# Patient Record
Sex: FEMALE | Race: WHITE | Employment: OTHER | URBAN - METROPOLITAN AREA
[De-identification: names, ages, dates, MRNs, and addresses within clinical notes are randomized per-mention and may not be internally consistent; named-entity substitution may affect disease eponyms.]

---

## 2022-07-19 PROCEDURE — 88342 IMHCHEM/IMCYTCHM 1ST ANTB: CPT | Performed by: DERMATOLOGY

## 2022-07-19 PROCEDURE — 88341 IMHCHEM/IMCYTCHM EA ADD ANTB: CPT | Performed by: DERMATOLOGY

## 2022-07-29 ENCOUNTER — LAB REQUISITION (OUTPATIENT)
Dept: LAB | Facility: HOSPITAL | Age: 72
End: 2022-07-29
Payer: MEDICARE

## 2022-07-29 DIAGNOSIS — Z00.00 ENCOUNTER FOR GENERAL ADULT MEDICAL EXAMINATION WITHOUT ABNORMAL FINDINGS: ICD-10-CM

## 2022-08-05 ENCOUNTER — OFFICE VISIT (OUTPATIENT)
Dept: SURGERY | Facility: CLINIC | Age: 72
End: 2022-08-05
Payer: MEDICARE

## 2022-08-05 VITALS
BODY MASS INDEX: 25.14 KG/M2 | RESPIRATION RATE: 16 BRPM | WEIGHT: 136.63 LBS | DIASTOLIC BLOOD PRESSURE: 79 MMHG | HEART RATE: 81 BPM | HEIGHT: 62 IN | OXYGEN SATURATION: 96 % | SYSTOLIC BLOOD PRESSURE: 166 MMHG

## 2022-08-05 DIAGNOSIS — C43.9 MALIGNANT MELANOMA, METASTATIC (HCC): Primary | ICD-10-CM

## 2022-08-05 DIAGNOSIS — C44.599 OTHER SPECIFIED MALIGNANT NEOPLASM OF SKIN OF OTHER PART OF TRUNK: ICD-10-CM

## 2022-08-05 PROCEDURE — 99205 OFFICE O/P NEW HI 60 MIN: CPT | Performed by: SURGERY

## 2022-08-05 RX ORDER — ALENDRONATE SODIUM 70 MG/1
TABLET ORAL
COMMUNITY
Start: 2022-08-03

## 2022-08-05 RX ORDER — ASPIRIN 81 MG/1
TABLET ORAL
COMMUNITY
Start: 2021-12-17

## 2022-08-05 RX ORDER — FLUTICASONE PROPIONATE 50 MCG
SPRAY, SUSPENSION (ML) NASAL
COMMUNITY
Start: 2021-09-14

## 2022-08-05 RX ORDER — MELOXICAM 7.5 MG/1
TABLET ORAL
COMMUNITY
Start: 2021-06-15

## 2022-08-08 ENCOUNTER — TELEPHONE (OUTPATIENT)
Dept: SURGERY | Facility: CLINIC | Age: 72
End: 2022-08-08

## 2022-08-08 NOTE — TELEPHONE ENCOUNTER
Faxed signed release of records to [facility] requesting CT chest imaging surveillance of lung nodules. Assisted with setting up pt's PET scan on 8/11 at 1808 Darin Samuel

## 2022-08-11 ENCOUNTER — HOSPITAL ENCOUNTER (OUTPATIENT)
Dept: NUCLEAR MEDICINE | Facility: HOSPITAL | Age: 72
Discharge: HOME | End: 2022-08-11
Attending: PHYSICIAN ASSISTANT
Payer: MEDICARE

## 2022-08-11 DIAGNOSIS — C44.599 OTHER SPECIFIED MALIGNANT NEOPLASM OF SKIN OF OTHER PART OF TRUNK: ICD-10-CM

## 2022-08-11 DIAGNOSIS — C43.9 MALIGNANT MELANOMA, METASTATIC (HCC): ICD-10-CM

## 2022-08-11 LAB — GLUCOSE BLD-MCNC: 104 MG/DL (ref 70–99)

## 2022-08-11 PROCEDURE — 78816 PET IMAGE W/CT FULL BODY: CPT | Performed by: PHYSICIAN ASSISTANT

## 2022-08-11 PROCEDURE — 82962 GLUCOSE BLOOD TEST: CPT

## 2022-08-12 ENCOUNTER — TELEPHONE (OUTPATIENT)
Dept: SURGERY | Facility: CLINIC | Age: 72
End: 2022-08-12

## 2022-08-12 NOTE — TELEPHONE ENCOUNTER
Returned patient call to discuss PET results. Follow up appointment to discuss next steps scheduled for 8/19 at 12:30p    Jennifer Valdez PA-C  Department of Surgical Oncology  [address] [facility], [address] [place] [address]  [address] [place] [address]  T: [phone]  F: [phone]

## 2022-08-18 ENCOUNTER — OFFICE VISIT (OUTPATIENT)
Dept: SURGERY | Facility: CLINIC | Age: 72
End: 2022-08-18
Payer: MEDICARE

## 2022-08-18 ENCOUNTER — TELEPHONE (OUTPATIENT)
Dept: SURGERY | Facility: CLINIC | Age: 72
End: 2022-08-18

## 2022-08-18 VITALS
BODY MASS INDEX: 25 KG/M2 | WEIGHT: 134 LBS | HEART RATE: 80 BPM | DIASTOLIC BLOOD PRESSURE: 87 MMHG | RESPIRATION RATE: 16 BRPM | OXYGEN SATURATION: 97 % | TEMPERATURE: 98 F | SYSTOLIC BLOOD PRESSURE: 144 MMHG

## 2022-08-18 DIAGNOSIS — Z85.820 PERSONAL HISTORY OF MALIGNANT MELANOMA OF SKIN: Primary | ICD-10-CM

## 2022-08-18 DIAGNOSIS — C43.9 MALIGNANT MELANOMA, METASTATIC (HCC): Primary | ICD-10-CM

## 2022-08-18 PROCEDURE — 99213 OFFICE O/P EST LOW 20 MIN: CPT | Performed by: SURGERY

## 2022-08-18 NOTE — TELEPHONE ENCOUNTER
Contacted [facility] lab in [place] on [address] [place] [address], [facility] to f/u on slides requested on 8/15, Slides from 9/2014 and 10/2014. Also contacted Naa John in [place] to find alternate contact information for this lab.

## 2022-08-22 NOTE — PROGRESS NOTES
Patient is here to discuss PET scan results. Explained to her that there does not appear to be any evidence of distant metastasis. We had a chance to discuss her case in a multidisciplinary tumor board. Plan as follows:  1. Referral to medical oncology for consideration of immunotherapy as resected tumor is consistent with dermal metastasis and not a primary  2. Reexcision to negative margins of the index tumor. Patient was counseled about the risk of surgery including and not limited to bleeding infection seroma formation. She agrees and wishes to proceed. I will set up an appointment with Dr. Federica Walls. James Fletcher MD  [facility] General Surgical Oncology  [facility] 112  Pager [phone]  [email or web address]

## 2022-08-23 ENCOUNTER — LAB ENCOUNTER (OUTPATIENT)
Dept: LAB | Facility: HOSPITAL | Age: 72
End: 2022-08-23
Attending: SURGERY
Payer: MEDICARE

## 2022-08-23 DIAGNOSIS — D03.9: Primary | ICD-10-CM

## 2022-08-23 DIAGNOSIS — C43.9 MALIGNANT MELANOMA, METASTATIC (HCC): ICD-10-CM

## 2022-08-23 DIAGNOSIS — Z85.820 PERSONAL HISTORY OF MALIGNANT MELANOMA OF SKIN: Primary | ICD-10-CM

## 2022-08-23 PROCEDURE — 88321 CONSLTJ&REPRT SLD PREP ELSWR: CPT

## 2022-08-29 ENCOUNTER — HOSPITAL ENCOUNTER (OUTPATIENT)
Facility: HOSPITAL | Age: 72
Setting detail: HOSPITAL OUTPATIENT SURGERY
Discharge: HOME | End: 2022-08-29
Attending: SURGERY | Admitting: SURGERY
Payer: MEDICARE

## 2022-08-29 ENCOUNTER — ANESTHESIA (OUTPATIENT)
Dept: SURGERY | Facility: HOSPITAL | Age: 72
End: 2022-08-29
Payer: MEDICARE

## 2022-08-29 ENCOUNTER — ANESTHESIA EVENT (OUTPATIENT)
Dept: SURGERY | Facility: HOSPITAL | Age: 72
End: 2022-08-29
Payer: MEDICARE

## 2022-08-29 VITALS
TEMPERATURE: 98 F | OXYGEN SATURATION: 97 % | WEIGHT: 134.19 LBS | SYSTOLIC BLOOD PRESSURE: 109 MMHG | RESPIRATION RATE: 18 BRPM | HEART RATE: 78 BPM | BODY MASS INDEX: 24.69 KG/M2 | HEIGHT: 62 IN | DIASTOLIC BLOOD PRESSURE: 89 MMHG

## 2022-08-29 DIAGNOSIS — C43.9 MALIGNANT MELANOMA, METASTATIC (HCC): ICD-10-CM

## 2022-08-29 DIAGNOSIS — Z20.822 ENCOUNTER FOR PREOPERATIVE SCREENING LABORATORY TESTING FOR COVID-19 VIRUS: Primary | ICD-10-CM

## 2022-08-29 DIAGNOSIS — Z01.812 ENCOUNTER FOR PREOPERATIVE SCREENING LABORATORY TESTING FOR COVID-19 VIRUS: Primary | ICD-10-CM

## 2022-08-29 LAB — SARS-COV-2 RNA RESP QL NAA+PROBE: NOT DETECTED

## 2022-08-29 PROCEDURE — 88307 TISSUE EXAM BY PATHOLOGIST: CPT | Performed by: SURGERY

## 2022-08-29 PROCEDURE — 0JB70ZX EXCISION OF BACK SUBCUTANEOUS TISSUE AND FASCIA, OPEN APPROACH, DIAGNOSTIC: ICD-10-PCS | Performed by: SURGERY

## 2022-08-29 PROCEDURE — 81210 BRAF GENE: CPT | Performed by: INTERNAL MEDICINE

## 2022-08-29 PROCEDURE — 88381 MICRODISSECTION MANUAL: CPT | Performed by: INTERNAL MEDICINE

## 2022-08-29 RX ORDER — HEPARIN SODIUM 5000 [USP'U]/ML
5000 INJECTION, SOLUTION INTRAVENOUS; SUBCUTANEOUS ONCE
Status: COMPLETED | OUTPATIENT
Start: 2022-08-29 | End: 2022-08-29

## 2022-08-29 RX ORDER — METOCLOPRAMIDE HYDROCHLORIDE 5 MG/ML
INJECTION INTRAMUSCULAR; INTRAVENOUS AS NEEDED
Status: DISCONTINUED | OUTPATIENT
Start: 2022-08-29 | End: 2022-08-29 | Stop reason: SURG

## 2022-08-29 RX ORDER — TRAMADOL HYDROCHLORIDE 50 MG/1
TABLET ORAL EVERY 4 HOURS PRN
Qty: 20 TABLET | Refills: 0 | Status: SHIPPED | OUTPATIENT
Start: 2022-08-29

## 2022-08-29 RX ORDER — CEFAZOLIN SODIUM/WATER 2 G/20 ML
2 SYRINGE (ML) INTRAVENOUS ONCE
Status: COMPLETED | OUTPATIENT
Start: 2022-08-29 | End: 2022-08-29

## 2022-08-29 RX ORDER — HYDROMORPHONE HYDROCHLORIDE 1 MG/ML
0.2 INJECTION, SOLUTION INTRAMUSCULAR; INTRAVENOUS; SUBCUTANEOUS EVERY 5 MIN PRN
Status: DISCONTINUED | OUTPATIENT
Start: 2022-08-29 | End: 2022-08-29

## 2022-08-29 RX ORDER — NALOXONE HYDROCHLORIDE 0.4 MG/ML
80 INJECTION, SOLUTION INTRAMUSCULAR; INTRAVENOUS; SUBCUTANEOUS AS NEEDED
Status: DISCONTINUED | OUTPATIENT
Start: 2022-08-29 | End: 2022-08-29

## 2022-08-29 RX ORDER — HYDROMORPHONE HYDROCHLORIDE 1 MG/ML
0.4 INJECTION, SOLUTION INTRAMUSCULAR; INTRAVENOUS; SUBCUTANEOUS EVERY 5 MIN PRN
Status: DISCONTINUED | OUTPATIENT
Start: 2022-08-29 | End: 2022-08-29

## 2022-08-29 RX ORDER — SODIUM CHLORIDE, SODIUM LACTATE, POTASSIUM CHLORIDE, CALCIUM CHLORIDE 600; 310; 30; 20 MG/100ML; MG/100ML; MG/100ML; MG/100ML
INJECTION, SOLUTION INTRAVENOUS CONTINUOUS
Status: DISCONTINUED | OUTPATIENT
Start: 2022-08-29 | End: 2022-08-29

## 2022-08-29 RX ORDER — HYDROMORPHONE HYDROCHLORIDE 1 MG/ML
0.6 INJECTION, SOLUTION INTRAMUSCULAR; INTRAVENOUS; SUBCUTANEOUS EVERY 5 MIN PRN
Status: DISCONTINUED | OUTPATIENT
Start: 2022-08-29 | End: 2022-08-29

## 2022-08-29 RX ORDER — MIDAZOLAM HYDROCHLORIDE 1 MG/ML
INJECTION INTRAMUSCULAR; INTRAVENOUS AS NEEDED
Status: DISCONTINUED | OUTPATIENT
Start: 2022-08-29 | End: 2022-08-29 | Stop reason: SURG

## 2022-08-29 RX ORDER — ACETAMINOPHEN 500 MG
1000 TABLET ORAL ONCE
Status: DISCONTINUED | OUTPATIENT
Start: 2022-08-29 | End: 2022-08-29 | Stop reason: HOSPADM

## 2022-08-29 RX ORDER — BUPIVACAINE HYDROCHLORIDE 2.5 MG/ML
INJECTION, SOLUTION EPIDURAL; INFILTRATION; INTRACAUDAL AS NEEDED
Status: DISCONTINUED | OUTPATIENT
Start: 2022-08-29 | End: 2022-08-29 | Stop reason: HOSPADM

## 2022-08-29 RX ORDER — GLYCOPYRROLATE 0.2 MG/ML
INJECTION, SOLUTION INTRAMUSCULAR; INTRAVENOUS AS NEEDED
Status: DISCONTINUED | OUTPATIENT
Start: 2022-08-29 | End: 2022-08-29

## 2022-08-29 RX ORDER — ONDANSETRON 2 MG/ML
INJECTION INTRAMUSCULAR; INTRAVENOUS AS NEEDED
Status: DISCONTINUED | OUTPATIENT
Start: 2022-08-29 | End: 2022-08-29 | Stop reason: SURG

## 2022-08-29 RX ADMIN — ONDANSETRON 4 MG: 2 INJECTION INTRAMUSCULAR; INTRAVENOUS at 16:20:00

## 2022-08-29 RX ADMIN — GLYCOPYRROLATE 0.2 MG: 0.2 INJECTION, SOLUTION INTRAMUSCULAR; INTRAVENOUS at 16:20:00

## 2022-08-29 RX ADMIN — METOCLOPRAMIDE HYDROCHLORIDE 10 MG: 5 INJECTION INTRAMUSCULAR; INTRAVENOUS at 16:20:00

## 2022-08-29 RX ADMIN — CEFAZOLIN SODIUM/WATER 2 G: 2 G/20 ML SYRINGE (ML) INTRAVENOUS at 16:26:00

## 2022-08-29 RX ADMIN — MIDAZOLAM HYDROCHLORIDE 2 MG: 1 INJECTION INTRAMUSCULAR; INTRAVENOUS at 16:20:00

## 2022-08-29 RX ADMIN — SODIUM CHLORIDE, SODIUM LACTATE, POTASSIUM CHLORIDE, CALCIUM CHLORIDE: 600; 310; 30; 20 INJECTION, SOLUTION INTRAVENOUS at 16:16:00

## 2022-08-29 NOTE — INTERVAL H&P NOTE
Patient seen and examined. No changes to the attached history and physical are noted. 66year old female presenting today for planned wle. Talat Celis MD  Complex General Surgical Oncology  [facility]  Pager [phone]  [email or web address] ambulatory

## 2022-08-29 NOTE — ANESTHESIA POSTPROCEDURE EVALUATION
[id number] Patient Status:  Hospital Outpatient Surgery   Age/Gender 66year old female MRN [id number]   [facility] SURGERY Attending Kelle Saravia MD   [facility] Day # 0 PCP RASHAD DE SANTIAGO       Anesthesia Post-op Note    Wide local excision right upper back melanoma    Procedure Summary     Date: 08/29/22 Room / Location: [facility] MAIN OR 02 / [facility] MAIN OR    Anesthesia Start: 7369 Anesthesia Stop: 5066    Procedure: Wide local excision right upper back melanoma (Right ) Diagnosis:       Malignant melanoma, metastatic (HCC)      (Malignant melanoma, metastatic ([facility] [address] [C43.9])    Surgeons: Kelle Saravia MD Anesthesiologist: Celso Holbrook MD    Anesthesia Type: MAC ASA Status: 2          Anesthesia Type: No value filed.     Vitals Value Taken Time   /76 08/29/22 1702   Temp 97.5 08/29/22 1702   Pulse 99 08/29/22 1702   Resp 18 08/29/22 1702   SpO2 98 08/29/22 1702       Anesthesia Postop Note

## 2022-08-30 NOTE — OPERATIVE REPORT
Date of operation 8/29/2022    Preoperative diagnosis:  1. Malignant melanoma    Operations performed:  1. Reexcision of upper back metastatic malignant melanoma    Postoperative diagnosis:  1. Same    Operating surgeon:  1. Chandni Bazan MD    Assistant:  1. ROBIN Max    Indications: This is a 70-year-old female who was diagnosed several years ago with melanoma in situ. The patient presented with a subcutaneous soft mass which was excised. Pathology was consistent with a metastatic melanoma. Metastatic work-up was otherwise negative. The case was discussed in our multidisciplinary tumor board. Since the metastatic lesion excisional margins were positive, she presents for reexcision of the scar to obtain negative margins. Details of the operation:  The patient was brought to the operating room and laid supine on the operating table. She was then repositioned into prone position. Monitored anesthesia care was administered. The upper back was prepped and draped in standard sterile manner. Timeout was completed verifying the patient's name, procedure to be performed and administration of antibiotics. Everybody in the room was in agreement. A total of 20 cc of 1% lidocaine intermixed with 0.5% Marcaine with epinephrine were infiltrated. The the recent scar as well as the adjacent historic scar were marked out. A 15 blade was used to incise the skin. The incision was deepened through skin and subcutaneous tissue with electrocautery. Dissection was carried with electrocautery towards the muscular fascia which was included with the specimen. The specimen was then gradually lifted off of its underlying attachments and sent off for final pathologic analysis after orientation. Hemostasis was excellent. The wound was closed in 2 layers: 3-0 Vicryl interrupted subcutaneous and 4-0 Vicryl running subcuticular. Steri-Strips and sterile dressings were applied.   The patient tolerated the procedure well was taken to the postoperative anesthesia care and in a stable state. I was present for the entire case. Isabella Dominguez MD  Complex General Surgical Oncology  [facility] 112  Pager [phone]  [email or web address]

## 2022-08-31 ENCOUNTER — TELEPHONE (OUTPATIENT)
Dept: SURGERY | Facility: CLINIC | Age: 72
End: 2022-08-31

## 2022-08-31 NOTE — TELEPHONE ENCOUNTER
Called patient yesterday to see how she is feeling after her procedure with Dr. Delfina Sanchez. Pt doing well, no pain, no issues with surgical site, reviewed wound care instructions. Pt has a post op appt on 9/8 with Clementina at [facility]. Path pending. Pt knows to call with any questions or concerns.

## 2022-09-07 ENCOUNTER — TELEPHONE (OUTPATIENT)
Dept: SURGERY | Facility: CLINIC | Age: 72
End: 2022-09-07

## 2022-09-07 NOTE — TELEPHONE ENCOUNTER
Discussed path. Will need re-excision    Taryn Rodríguez MD  Complex General Surgical Oncology  [facility] 112  Pager [phone]  [email or web address]

## 2022-09-08 ENCOUNTER — OFFICE VISIT (OUTPATIENT)
Dept: SURGERY | Facility: CLINIC | Age: 72
End: 2022-09-08
Payer: MEDICARE

## 2022-09-08 ENCOUNTER — OFFICE VISIT (OUTPATIENT)
Dept: HEMATOLOGY/ONCOLOGY | Facility: HOSPITAL | Age: 72
End: 2022-09-08
Attending: INTERNAL MEDICINE
Payer: MEDICARE

## 2022-09-08 ENCOUNTER — TELEPHONE (OUTPATIENT)
Dept: HEMATOLOGY/ONCOLOGY | Facility: HOSPITAL | Age: 72
End: 2022-09-08

## 2022-09-08 VITALS
RESPIRATION RATE: 16 BRPM | HEART RATE: 88 BPM | WEIGHT: 134 LBS | DIASTOLIC BLOOD PRESSURE: 91 MMHG | OXYGEN SATURATION: 97 % | HEIGHT: 62.01 IN | SYSTOLIC BLOOD PRESSURE: 164 MMHG | TEMPERATURE: 98 F | BODY MASS INDEX: 24.35 KG/M2

## 2022-09-08 VITALS
OXYGEN SATURATION: 95 % | WEIGHT: 133 LBS | BODY MASS INDEX: 24.48 KG/M2 | HEIGHT: 62 IN | HEART RATE: 81 BPM | SYSTOLIC BLOOD PRESSURE: 157 MMHG | TEMPERATURE: 98 F | RESPIRATION RATE: 16 BRPM | DIASTOLIC BLOOD PRESSURE: 95 MMHG

## 2022-09-08 DIAGNOSIS — Z85.820 PERSONAL HISTORY OF MALIGNANT MELANOMA OF SKIN: Primary | ICD-10-CM

## 2022-09-08 DIAGNOSIS — C43.9 MALIGNANT MELANOMA, METASTATIC (HCC): Primary | ICD-10-CM

## 2022-09-08 PROCEDURE — 99205 OFFICE O/P NEW HI 60 MIN: CPT | Performed by: INTERNAL MEDICINE

## 2022-09-08 NOTE — PROGRESS NOTES
Outpatient Oncology Care Plan   Problem list:   fatigue   Problems related to:   disease/disease progression   Interventions:   provided general teaching   Expected outcomes:   understands plan of care   Progress towards outcome: making progress     Education Record   Learner: Patient   Barriers / Limitations: None   Method: Discussion   Outcome: Shows understanding   Comments:  Patient here as a new consult. Still experiencing pain to site. Will have repeat procedure next week. Denies any fevers, fatigue, or other symptoms at this time. Here to discuss next steps in plan of care.

## 2022-09-09 ENCOUNTER — TELEPHONE (OUTPATIENT)
Dept: HEMATOLOGY/ONCOLOGY | Facility: HOSPITAL | Age: 72
End: 2022-09-09

## 2022-09-09 NOTE — TELEPHONE ENCOUNTER
Returned call, pt had questions about when she should do the MRI of the brain. Pt having another surgery on her back next Friday. Was planning to schedule the MRI a week or 2 after the next surgery, it will be prior to starting therapy. In addition, she had questions about the stage of her cancer. Let her know we will call her back Monday after confirming with Dr Lucien Fitzgerald. Pt comfortable with the plan.

## 2022-09-09 NOTE — TELEPHONE ENCOUNTER
Patient calling and had consult with Dr Cherlynn Barthel yesterday. She has further questions.     Please call patient

## 2022-09-12 ENCOUNTER — TELEPHONE (OUTPATIENT)
Dept: SURGERY | Facility: CLINIC | Age: 72
End: 2022-09-12

## 2022-09-16 ENCOUNTER — OFFICE VISIT (OUTPATIENT)
Dept: SURGERY | Facility: CLINIC | Age: 72
End: 2022-09-16
Payer: MEDICARE

## 2022-09-16 ENCOUNTER — OFFICE VISIT (OUTPATIENT)
Dept: HEMATOLOGY/ONCOLOGY | Facility: HOSPITAL | Age: 72
End: 2022-09-16
Attending: INTERNAL MEDICINE
Payer: MEDICARE

## 2022-09-16 VITALS
HEIGHT: 62.01 IN | OXYGEN SATURATION: 96 % | TEMPERATURE: 98 F | WEIGHT: 134 LBS | HEART RATE: 113 BPM | RESPIRATION RATE: 16 BRPM | BODY MASS INDEX: 24.35 KG/M2 | DIASTOLIC BLOOD PRESSURE: 84 MMHG | SYSTOLIC BLOOD PRESSURE: 147 MMHG

## 2022-09-16 DIAGNOSIS — C43.9 MALIGNANT MELANOMA, METASTATIC (HCC): Primary | ICD-10-CM

## 2022-09-16 DIAGNOSIS — Z71.89 OTHER SPECIFIED COUNSELING: ICD-10-CM

## 2022-09-16 DIAGNOSIS — C43.59 MALIGNANT MELANOMA OF TORSO EXCLUDING BREAST (HCC): Primary | ICD-10-CM

## 2022-09-16 LAB — BRAF CODON 600 MUTATION DETECT: NOT DETECTED

## 2022-09-16 PROCEDURE — 88307 TISSUE EXAM BY PATHOLOGIST: CPT | Performed by: PHYSICIAN ASSISTANT

## 2022-09-16 PROCEDURE — 99024 POSTOP FOLLOW-UP VISIT: CPT | Performed by: SURGERY

## 2022-09-16 PROCEDURE — 11606 EXC TR-EXT MAL+MARG >4 CM: CPT | Performed by: SURGERY

## 2022-09-19 NOTE — PROCEDURES
Date of operation 9/16/2022    Preoperative diagnosis: #1 history of metastatic melanoma melanoma status post resection, upper back  2. Positive margin between 6 and 9 o'clock position/deep    Operations performed: #1 reexcision of involved margins, malignant melanoma upper back    Postoperative diagnosis:  1. Same    Operating Surgeon:  1. Martín Mejía MD    Assistant:  1. ROBIN Hamm    Indications: This is a very pleasant 13-year-old female who was diagnosed with metastatic melanoma involving the dermis and subcutis. She underwent wide local excision however margins were positive at the deep level and between 6 and 9:00. She presents for reexcision. Details of the operation:  The patient was brought to the procedure room and was laid in the prone position. The back was prepped and draped in the standard sterile manner. Timeout was completed verifying the patient's name, procedure to be performed and exact position/location. Everybody in the room was in agreement. A total of 25 cc of 1% lidocaine intermixed with 0.5 sent Marcaine with epinephrine were infiltrated. Using a 15 blade, an elliptical incision was made in line with the previous incision which was oriented at a 30 degree angle. This was deepened through skin and subcutaneous tissue. The incision was designed to include a centimeter of margin from the 6:00 to the 9 o'clock position of the incision [lower half.]  The previous incision was opened to maintain orientation. Electrocautery was used to incise the skin and subcutaneous tissue. Dissection was carried towards the muscle and the fascia was included alongside the specimen. The specimen was then lifted off of its underlying attachments with electrocautery. It was oriented and sent off for final pathologic analysis. The wound was irrigated thoroughly.   The wound was then closed in 2 layers: 3-0 Vicryl interrupted subcutaneous and 4-0 Vicryl running subcuticular. Steri-Strips and sterile dressings were applied. The patient tolerated the procedure well. I was present for the entire procedure. Nimisha Graf MD  [facility] General Surgical Oncology  [place] [address]  Pager [phone]  [email or web address]

## 2022-09-22 ENCOUNTER — TELEPHONE (OUTPATIENT)
Dept: SURGERY | Facility: CLINIC | Age: 72
End: 2022-09-22

## 2022-09-22 NOTE — TELEPHONE ENCOUNTER
Discussed pathology results in detail with patient. We will see her for previously scheduled post op visit Friday 9/30    Danna Matias PA-C  Department of [address] [place] [address]  [address] [facility] [place] [address] [place], [address]  T: [phone]  F: [phone]

## 2022-09-22 NOTE — TELEPHONE ENCOUNTER
Pt called with questions about pathology report, explained her case was discussed in melanoma TB. She is feeling well. Will have Dr. Bess Recio or Clementina KELLY call patient back to discuss pathology in more detail.

## 2022-09-22 NOTE — TELEPHONE ENCOUNTER
Feels ok  Discussed path    Cydney Carmona MD  Complex General Surgical Oncology  [facility] 112  Pager [phone]  [email or web address]

## 2022-09-30 ENCOUNTER — HOSPITAL ENCOUNTER (OUTPATIENT)
Dept: MRI IMAGING | Facility: HOSPITAL | Age: 72
Discharge: HOME | End: 2022-09-30
Attending: INTERNAL MEDICINE
Payer: MEDICARE

## 2022-09-30 ENCOUNTER — TELEPHONE (OUTPATIENT)
Dept: SURGERY | Facility: CLINIC | Age: 72
End: 2022-09-30

## 2022-09-30 ENCOUNTER — OFFICE VISIT (OUTPATIENT)
Dept: SURGERY | Facility: CLINIC | Age: 72
End: 2022-09-30
Payer: MEDICARE

## 2022-09-30 VITALS
OXYGEN SATURATION: 100 % | DIASTOLIC BLOOD PRESSURE: 94 MMHG | RESPIRATION RATE: 16 BRPM | HEART RATE: 84 BPM | SYSTOLIC BLOOD PRESSURE: 172 MMHG | TEMPERATURE: 98 F

## 2022-09-30 DIAGNOSIS — C43.9 MALIGNANT MELANOMA, METASTATIC (HCC): Primary | ICD-10-CM

## 2022-09-30 DIAGNOSIS — Z85.820 PERSONAL HISTORY OF MALIGNANT MELANOMA OF SKIN: ICD-10-CM

## 2022-09-30 PROCEDURE — A9575 INJ GADOTERATE MEGLUMI 0.1ML: HCPCS | Performed by: INTERNAL MEDICINE

## 2022-09-30 PROCEDURE — 99213 OFFICE O/P EST LOW 20 MIN: CPT | Performed by: SURGERY

## 2022-09-30 PROCEDURE — 70553 MRI BRAIN STEM W/O & W/DYE: CPT | Performed by: INTERNAL MEDICINE

## 2022-10-06 ENCOUNTER — SOCIAL WORK SERVICES (OUTPATIENT)
Dept: HEMATOLOGY/ONCOLOGY | Facility: HOSPITAL | Age: 72
End: 2022-10-06

## 2022-10-06 ENCOUNTER — OFFICE VISIT (OUTPATIENT)
Dept: HEMATOLOGY/ONCOLOGY | Facility: HOSPITAL | Age: 72
End: 2022-10-06
Attending: INTERNAL MEDICINE
Payer: MEDICARE

## 2022-10-06 ENCOUNTER — TELEPHONE (OUTPATIENT)
Dept: SURGERY | Facility: CLINIC | Age: 72
End: 2022-10-06

## 2022-10-06 VITALS
DIASTOLIC BLOOD PRESSURE: 93 MMHG | WEIGHT: 134 LBS | TEMPERATURE: 98 F | HEART RATE: 83 BPM | BODY MASS INDEX: 24.35 KG/M2 | SYSTOLIC BLOOD PRESSURE: 164 MMHG | HEIGHT: 62.01 IN | OXYGEN SATURATION: 96 % | RESPIRATION RATE: 16 BRPM

## 2022-10-06 DIAGNOSIS — Z85.820 PERSONAL HISTORY OF MALIGNANT MELANOMA OF SKIN: ICD-10-CM

## 2022-10-06 DIAGNOSIS — Z85.820 PERSONAL HISTORY OF MALIGNANT MELANOMA OF SKIN: Primary | ICD-10-CM

## 2022-10-06 DIAGNOSIS — Z86.006 HISTORY OF MELANOMA IN SITU: ICD-10-CM

## 2022-10-06 LAB
ALBUMIN SERPL-MCNC: 3.8 G/DL (ref 3.4–5)
ALBUMIN/GLOB SERPL: 1.1 {RATIO} (ref 1–2)
ALP LIVER SERPL-CCNC: 83 U/L
ALT SERPL-CCNC: 42 U/L
ANION GAP SERPL CALC-SCNC: 7 MMOL/L (ref 0–18)
AST SERPL-CCNC: 27 U/L (ref 15–37)
BASOPHILS # BLD AUTO: 0.03 X10(3) UL (ref 0–0.2)
BASOPHILS NFR BLD AUTO: 0.3 %
BILIRUB SERPL-MCNC: 0.4 MG/DL (ref 0.1–2)
BUN BLD-MCNC: 11 MG/DL (ref 7–18)
CALCIUM BLD-MCNC: 9 MG/DL (ref 8.5–10.1)
CHLORIDE SERPL-SCNC: 109 MMOL/L (ref 98–112)
CO2 SERPL-SCNC: 21 MMOL/L (ref 21–32)
CREAT BLD-MCNC: 0.81 MG/DL
EOSINOPHIL # BLD AUTO: 0.12 X10(3) UL (ref 0–0.7)
EOSINOPHIL NFR BLD AUTO: 1.4 %
ERYTHROCYTE [DISTWIDTH] IN BLOOD BY AUTOMATED COUNT: 12.7 %
GFR SERPLBLD BASED ON 1.73 SQ M-ARVRAT: 77 ML/MIN/1.73M2 (ref 60–?)
GLOBULIN PLAS-MCNC: 3.5 G/DL (ref 2.8–4.4)
GLUCOSE BLD-MCNC: 100 MG/DL (ref 70–99)
HCT VFR BLD AUTO: 43.5 %
HGB BLD-MCNC: 14.9 G/DL
IMM GRANULOCYTES # BLD AUTO: 0.02 X10(3) UL (ref 0–1)
IMM GRANULOCYTES NFR BLD: 0.2 %
LYMPHOCYTES # BLD AUTO: 2.7 X10(3) UL (ref 1–4)
LYMPHOCYTES NFR BLD AUTO: 31.2 %
MCH RBC QN AUTO: 34.3 PG (ref 26–34)
MCHC RBC AUTO-ENTMCNC: 34.3 G/DL (ref 31–37)
MCV RBC AUTO: 100.2 FL
MONOCYTES # BLD AUTO: 0.57 X10(3) UL (ref 0.1–1)
MONOCYTES NFR BLD AUTO: 6.6 %
NEUTROPHILS # BLD AUTO: 5.22 X10 (3) UL (ref 1.5–7.7)
NEUTROPHILS # BLD AUTO: 5.22 X10(3) UL (ref 1.5–7.7)
NEUTROPHILS NFR BLD AUTO: 60.3 %
OSMOLALITY SERPL CALC.SUM OF ELEC: 283 MOSM/KG (ref 275–295)
PLATELET # BLD AUTO: 405 10(3)UL (ref 150–450)
POTASSIUM SERPL-SCNC: 4 MMOL/L (ref 3.5–5.1)
PROT SERPL-MCNC: 7.3 G/DL (ref 6.4–8.2)
RBC # BLD AUTO: 4.34 X10(6)UL
SODIUM SERPL-SCNC: 137 MMOL/L (ref 136–145)
T4 FREE SERPL-MCNC: 0.8 NG/DL (ref 0.8–1.7)
TSI SER-ACNC: 1.71 MIU/ML (ref 0.36–3.74)
WBC # BLD AUTO: 8.7 X10(3) UL (ref 4–11)

## 2022-10-06 PROCEDURE — 96413 CHEMO IV INFUSION 1 HR: CPT

## 2022-10-06 PROCEDURE — 99214 OFFICE O/P EST MOD 30 MIN: CPT | Performed by: INTERNAL MEDICINE

## 2022-10-06 NOTE — PROGRESS NOTES
Outpatient Oncology Care Plan   Problem list:   fatigue   Problems related to:   side effect of treatment   Interventions:   provided general teaching   Expected outcomes:   understands plan of care   Progress towards outcome: making progress     Education Record   Learner: Patient   Barriers / Limitations: None   Method: Discussion   Outcome: Shows understanding   Comments:  Patient here for follow-up and planned C1D1 Keytruda. Denies any new or worsening symptoms since last visit.

## 2022-10-06 NOTE — PROGRESS NOTES
Pt here for C1D1. Arrives Ambulating independently, accompanied by Self           Pregnancy screening: Not applicable    Modifications in dose or schedule: No    Drugs/infusions dual verified for appearance and physical integrity. IV pump settings were dual verified: yes     Frequency of blood return and site check throughout administration: Prior to administration and At completion of therapy   Discharged to Home, Ambulating independently, accompanied by:Self    Outpatient Oncology Care Plan  Problem list:  knowledge deficit  Problems related to:  chemotherapy  Interventions:  provided general teaching  Expected outcomes:  understands plan of care  Progress towards outcome:  making progress    Education Record    Learner:  Patient  Barriers / Limitations:  None  Method:  Discussion and Reinforcement  Outcome:  Shows understanding  Comments: Pt tolerated infusion. Pt left in stable condition.

## 2022-10-06 NOTE — PROGRESS NOTES
SW completed an assessment with pt to provide support and encouragement due to treatment starting today. Chart reviewed and distress screening of 1 noted. Pt lives in [place], [place]. She is . Pt states that her adult daughter, Sheila Arenas is her support. Social determinants of health assessment completed with pt and no needs identified at this time. Pt presents with bright affect and mood. SW reviewed cancer support resources including [facility] and [employer]. Pt lives in the [address] [place] [address] and states that she may pursue [employer] which is close to her daughter. SW provided a packet of resources including information on transportation resources, homecare/home health agencies, Facebook support group page and counseling resources. SW reviewed Advance Directives and importance of completion. SW explained role of SW in [facility] and provided support as pt shared feelings and thoughts on cancer diagnosis. SW contact information given. SW provided a [employer] bag which pt was very grateful for. Pt requested a blank copy of the [facility] that she will review and complete with her daughter. She plans to bring back for her next appt with Dr. Federica Walls or treatment. Coping skills reviewed and support services encouraged due to cancer dx. Leta Ramirez LCSW   at [facility]    [address], [address] [place] [address], [place], [address] [place] [address] [place] [address] Yasir Carter  Ph: [phone]. [email or web address]  Fax: [phone]

## 2022-10-07 ENCOUNTER — TELEPHONE (OUTPATIENT)
Dept: HEMATOLOGY/ONCOLOGY | Facility: HOSPITAL | Age: 72
End: 2022-10-07

## 2022-10-07 NOTE — TELEPHONE ENCOUNTER
Toxicities: C1 D1 Pembrolizumab on 10/6/2022    I attempted to reach Yennifer Barakat to see how she is feeling after her first day of treatment. I left a voice mail message asking her to please return my call at her earliest convenience.

## 2022-10-11 ENCOUNTER — TELEPHONE (OUTPATIENT)
Dept: HEMATOLOGY/ONCOLOGY | Facility: HOSPITAL | Age: 72
End: 2022-10-11

## 2022-10-11 NOTE — TELEPHONE ENCOUNTER
Patient had immunotherapy last thursday. She has hives since 10/7/22. She did take benadryl. It helped a little. Please call. Thank you. None

## 2022-10-11 NOTE — TELEPHONE ENCOUNTER
Toxicities: C1 D1 Pembrolizumab on 10/6/2022    Hives/Itching: (Patient reports Friday evening she noticed her arms felt itchy. No redness or rash. She also saw some hives on her neck. She took 1 benadryl and the itching and hives resolved. The same thing happened on Saturday evening. Again she took 1 benadryl and it resolved. No problems on Sunday. On Monday evening the hives on her neck and itching on her arms returned. She took 1 benadryl and it resolved. Today the hives are back on her neck and she has itching on her arms. She denies redness or rash. No dyspnea, swollen lips or tongue. No difficulty swallowing food or fluids.)    I asked Radha Luna to please hold. I updated Kiarra Esteves. She recommended Radha Luna take 1-2 tabs of benadryl every 4-6 hours as needed. I updated Radha Luna. She agreed with the plan. She will call the office if her symptoms get worse.

## 2022-10-13 ENCOUNTER — VIRTUAL PHONE E/M (OUTPATIENT)
Dept: HEMATOLOGY/ONCOLOGY | Facility: HOSPITAL | Age: 72
End: 2022-10-13
Attending: INTERNAL MEDICINE
Payer: MEDICARE

## 2022-10-13 DIAGNOSIS — L50.9 HIVES: ICD-10-CM

## 2022-10-13 DIAGNOSIS — C43.9 METASTATIC MELANOMA (HCC): ICD-10-CM

## 2022-10-13 PROCEDURE — G2252 BRIEF CHKIN BY MD/QHP, 11-20: HCPCS | Performed by: CLINICAL NURSE SPECIALIST

## 2022-10-13 RX ORDER — PREDNISONE 10 MG/1
10 TABLET ORAL DAILY
Qty: 30 TABLET | Refills: 0 | Status: SHIPPED | OUTPATIENT
Start: 2022-10-13

## 2022-10-25 ENCOUNTER — TELEPHONE (OUTPATIENT)
Dept: SURGERY | Facility: CLINIC | Age: 72
End: 2022-10-25

## 2022-10-25 ENCOUNTER — TELEPHONE (OUTPATIENT)
Dept: HEMATOLOGY/ONCOLOGY | Facility: HOSPITAL | Age: 72
End: 2022-10-25

## 2022-10-25 PROBLEM — M79.606 PAIN IN LOWER LIMB: Status: ACTIVE | Noted: 2022-10-25

## 2022-10-25 PROBLEM — I77.811 ABDOMINAL AORTIC ECTASIA: Status: ACTIVE | Noted: 2022-10-25

## 2022-10-25 PROBLEM — H81.10 BENIGN PAROXYSMAL POSITIONAL VERTIGO: Status: ACTIVE | Noted: 2022-10-25

## 2022-10-25 PROBLEM — I77.811 ABDOMINAL AORTIC ECTASIA (HCC): Status: ACTIVE | Noted: 2022-10-25

## 2022-10-25 PROBLEM — R06.09 DYSPNEA ON EXERTION: Status: ACTIVE | Noted: 2022-10-25

## 2022-10-25 RX ORDER — CETIRIZINE HYDROCHLORIDE 10 MG/1
10 TABLET ORAL EVERY 12 HOURS PRN
Qty: 60 TABLET | Refills: 0 | Status: SHIPPED | OUTPATIENT
Start: 2022-10-25

## 2022-10-25 NOTE — TELEPHONE ENCOUNTER
Spoke with patient. She started prednisone 10 mg and Zyrtec on Friday 10/21/22 due to worsening hives. She wanted to confirm she is still able to receive immunotherapy on Thursday 10/27/22. Per MD, safe to proceed. Patient verbalized understanding.

## 2022-10-25 NOTE — TELEPHONE ENCOUNTER
Renetta He calling asking if she take her prescribed medication with her immuno therapy.  Thank you nora

## 2022-10-25 NOTE — TELEPHONE ENCOUNTER
Patient requested a copy of Dr Marcy Cruz progress notes, OR report, path report and consult post op visit note.  PT states she will pick it up at [facility].   I'll give it to Linnea Gonzalez tomorrow to take to [facility].

## 2022-10-27 ENCOUNTER — OFFICE VISIT (OUTPATIENT)
Dept: HEMATOLOGY/ONCOLOGY | Facility: HOSPITAL | Age: 72
End: 2022-10-27
Attending: INTERNAL MEDICINE
Payer: MEDICARE

## 2022-10-27 VITALS
HEART RATE: 85 BPM | OXYGEN SATURATION: 98 % | TEMPERATURE: 97 F | SYSTOLIC BLOOD PRESSURE: 148 MMHG | WEIGHT: 137.5 LBS | RESPIRATION RATE: 16 BRPM | BODY MASS INDEX: 24.98 KG/M2 | DIASTOLIC BLOOD PRESSURE: 89 MMHG | HEIGHT: 62.01 IN

## 2022-10-27 DIAGNOSIS — Z85.820 PERSONAL HISTORY OF MALIGNANT MELANOMA OF SKIN: Primary | ICD-10-CM

## 2022-10-27 DIAGNOSIS — Z85.820 PERSONAL HISTORY OF MALIGNANT MELANOMA OF SKIN: ICD-10-CM

## 2022-10-27 LAB
ALBUMIN SERPL-MCNC: 3.9 G/DL (ref 3.4–5)
ALBUMIN/GLOB SERPL: 1.1 {RATIO} (ref 1–2)
ALP LIVER SERPL-CCNC: 78 U/L
ALT SERPL-CCNC: 66 U/L
ANION GAP SERPL CALC-SCNC: 4 MMOL/L (ref 0–18)
AST SERPL-CCNC: 44 U/L (ref 15–37)
BASOPHILS # BLD AUTO: 0.07 X10(3) UL (ref 0–0.2)
BASOPHILS NFR BLD AUTO: 0.5 %
BILIRUB SERPL-MCNC: 0.3 MG/DL (ref 0.1–2)
BUN BLD-MCNC: 13 MG/DL (ref 7–18)
CALCIUM BLD-MCNC: 9.1 MG/DL (ref 8.5–10.1)
CHLORIDE SERPL-SCNC: 105 MMOL/L (ref 98–112)
CO2 SERPL-SCNC: 27 MMOL/L (ref 21–32)
CREAT BLD-MCNC: 0.87 MG/DL
EOSINOPHIL # BLD AUTO: 0.09 X10(3) UL (ref 0–0.7)
EOSINOPHIL NFR BLD AUTO: 0.7 %
ERYTHROCYTE [DISTWIDTH] IN BLOOD BY AUTOMATED COUNT: 13 %
GFR SERPLBLD BASED ON 1.73 SQ M-ARVRAT: 70 ML/MIN/1.73M2 (ref 60–?)
GLOBULIN PLAS-MCNC: 3.7 G/DL (ref 2.8–4.4)
GLUCOSE BLD-MCNC: 96 MG/DL (ref 70–99)
HCT VFR BLD AUTO: 43 %
HGB BLD-MCNC: 14.8 G/DL
IMM GRANULOCYTES # BLD AUTO: 0.05 X10(3) UL (ref 0–1)
IMM GRANULOCYTES NFR BLD: 0.4 %
LYMPHOCYTES # BLD AUTO: 3.01 X10(3) UL (ref 1–4)
LYMPHOCYTES NFR BLD AUTO: 23 %
MCH RBC QN AUTO: 34.5 PG (ref 26–34)
MCHC RBC AUTO-ENTMCNC: 34.4 G/DL (ref 31–37)
MCV RBC AUTO: 100.2 FL
MONOCYTES # BLD AUTO: 0.63 X10(3) UL (ref 0.1–1)
MONOCYTES NFR BLD AUTO: 4.8 %
NEUTROPHILS # BLD AUTO: 9.25 X10 (3) UL (ref 1.5–7.7)
NEUTROPHILS # BLD AUTO: 9.25 X10(3) UL (ref 1.5–7.7)
NEUTROPHILS NFR BLD AUTO: 70.6 %
OSMOLALITY SERPL CALC.SUM OF ELEC: 282 MOSM/KG (ref 275–295)
PLATELET # BLD AUTO: 391 10(3)UL (ref 150–450)
POTASSIUM SERPL-SCNC: 3.5 MMOL/L (ref 3.5–5.1)
PROT SERPL-MCNC: 7.6 G/DL (ref 6.4–8.2)
RBC # BLD AUTO: 4.29 X10(6)UL
SODIUM SERPL-SCNC: 136 MMOL/L (ref 136–145)
WBC # BLD AUTO: 13.1 X10(3) UL (ref 4–11)

## 2022-10-27 PROCEDURE — 96413 CHEMO IV INFUSION 1 HR: CPT

## 2022-10-27 PROCEDURE — 99215 OFFICE O/P EST HI 40 MIN: CPT | Performed by: INTERNAL MEDICINE

## 2022-10-27 PROCEDURE — S0028 INJECTION, FAMOTIDINE, 20 MG: HCPCS | Performed by: INTERNAL MEDICINE

## 2022-10-27 PROCEDURE — 96375 TX/PRO/DX INJ NEW DRUG ADDON: CPT

## 2022-10-27 RX ORDER — FAMOTIDINE 10 MG/ML
20 INJECTION, SOLUTION INTRAVENOUS ONCE
Status: COMPLETED | OUTPATIENT
Start: 2022-10-27 | End: 2022-10-27

## 2022-10-27 RX ADMIN — FAMOTIDINE 20 MG: 10 INJECTION, SOLUTION INTRAVENOUS at 13:22:00

## 2022-10-27 NOTE — PROGRESS NOTES
Outpatient Oncology Care Plan   Problem list:   fatigue   Problems related to:   side effect of treatment   Interventions:   provided general teaching   Expected outcomes:   understands plan of care   Progress towards outcome: making progress     Education Record   Learner: Patient   Barriers / Limitations: None   Method: Discussion   Outcome: Shows understanding   Comments:  Patient here for follow-up and planned C2D1 treatment. Still remains on Prednisone and Zyrtec for her rash. She states the rash has completely resolved as of today. Energy levels are somewhat lower.

## 2022-10-27 NOTE — PROGRESS NOTES
Pt here for C2D1 of Keytruda  Arrives Ambulating independently, accompanied by Self           Pregnancy screening: Not applicable    Modifications in dose or schedule: Yes    Drugs/infusions dual verified for appearance and physical integrity. IV pump settings were dual verified: yes     Frequency of blood return and site check throughout administration: Prior to administration   Discharged to Home, Ambulating independently, accompanied by:Self    Outpatient Oncology Care Plan  Problem list:  knowledge deficit  Problems related to:  chemotherapy  Interventions:  chemotherapy teaching  Expected outcomes:  optimal lab values  Progress towards outcome:  making progress    Education Record    Learner:  Patient  Barriers / Limitations:  None  Method:  Brief focused  Outcome:  Shows understanding  Comments:    Patient was given Pepcid IV prior to her Anne-Marie Bee this time .  Apparently she had developed a rash that lasted few days after first Keytruda was infused

## 2022-10-31 ENCOUNTER — SOCIAL WORK SERVICES (OUTPATIENT)
Dept: HEMATOLOGY/ONCOLOGY | Facility: HOSPITAL | Age: 72
End: 2022-10-31

## 2022-10-31 NOTE — PROGRESS NOTES
spoke to patient and completed [employer] Form, mailing to patient along with required documentation/pathology reports.

## 2022-11-10 ENCOUNTER — TELEMEDICINE (OUTPATIENT)
Dept: HEMATOLOGY/ONCOLOGY | Facility: HOSPITAL | Age: 72
End: 2022-11-10
Attending: INTERNAL MEDICINE
Payer: MEDICARE

## 2022-11-10 ENCOUNTER — TELEPHONE (OUTPATIENT)
Dept: HEMATOLOGY/ONCOLOGY | Facility: HOSPITAL | Age: 72
End: 2022-11-10

## 2022-11-10 DIAGNOSIS — R21 RASH: ICD-10-CM

## 2022-11-10 DIAGNOSIS — R22.1 NECK SWELLING: ICD-10-CM

## 2022-11-10 DIAGNOSIS — C43.9 METASTATIC MELANOMA (HCC): Primary | ICD-10-CM

## 2022-11-10 PROCEDURE — 99213 OFFICE O/P EST LOW 20 MIN: CPT | Performed by: CLINICAL NURSE SPECIALIST

## 2022-11-10 NOTE — TELEPHONE ENCOUNTER
Patient is having a new reaction to her Javad Fran and it is coming and going and everyday, She has a rash is all over her entire body, swollen lymph nodes, Dr. Stephanie Oquendo pt.

## 2022-11-10 NOTE — TELEPHONE ENCOUNTER
Toxicities: C2 D1 Pembrolizumab on 10/27/2022    Rash/Swollen Lymph Node: (Patient reports that on 10/29/2022, two days after her second treatment she woke up with a red, raised, itchy rash all over her body with the exception of the face. She states she had a similar rash after her first treatment. She has been taking one tab of zyrtec twice a day and 10mg of prednisone daily. The rash seemed to get better. It was no longer raised and it was less itchy. Two days ago the rash flared again. It is red, not raised, itchy. No fluid filled vesicles. She also reports last Thursday she found a swollen lymph node in the center of the front of her neck between her two collar bones. It was hard to touch. The skin felt hot. This symptoms lasted 2 days and then resolved. Yesterday the swollen lymph node returned.)    Remington Sullivan said she lives two hours away. She won't come for an acute care visit today. She would like a call back with what she should do? Should she increase her dose of prednisone?

## 2022-11-10 NOTE — TELEPHONE ENCOUNTER
I called Vanda Cervantes and updated her that Dr Kimberlee Giles wants her to have an ACV video visit today. She agreed. I [facility] [address] [place] [phone] to book the appointment and explain how the video visit works.

## 2022-11-17 ENCOUNTER — OFFICE VISIT (OUTPATIENT)
Dept: HEMATOLOGY/ONCOLOGY | Facility: HOSPITAL | Age: 72
End: 2022-11-17
Attending: INTERNAL MEDICINE
Payer: MEDICARE

## 2022-11-17 VITALS
BODY MASS INDEX: 26 KG/M2 | TEMPERATURE: 98 F | OXYGEN SATURATION: 96 % | RESPIRATION RATE: 18 BRPM | DIASTOLIC BLOOD PRESSURE: 89 MMHG | HEART RATE: 89 BPM | WEIGHT: 140.38 LBS | SYSTOLIC BLOOD PRESSURE: 150 MMHG

## 2022-11-17 DIAGNOSIS — Z85.820 PERSONAL HISTORY OF MALIGNANT MELANOMA OF SKIN: ICD-10-CM

## 2022-11-17 DIAGNOSIS — Z85.820 PERSONAL HISTORY OF MALIGNANT MELANOMA OF SKIN: Primary | ICD-10-CM

## 2022-11-17 LAB
ALBUMIN SERPL-MCNC: 3.9 G/DL (ref 3.4–5)
ALBUMIN/GLOB SERPL: 1.2 {RATIO} (ref 1–2)
ALP LIVER SERPL-CCNC: 90 U/L
ALT SERPL-CCNC: 59 U/L
ANION GAP SERPL CALC-SCNC: 4 MMOL/L (ref 0–18)
AST SERPL-CCNC: 50 U/L (ref 15–37)
BASOPHILS # BLD AUTO: 0.08 X10(3) UL (ref 0–0.2)
BASOPHILS NFR BLD AUTO: 0.7 %
BILIRUB SERPL-MCNC: 0.3 MG/DL (ref 0.1–2)
BUN BLD-MCNC: 11 MG/DL (ref 7–18)
CALCIUM BLD-MCNC: 9.2 MG/DL (ref 8.5–10.1)
CHLORIDE SERPL-SCNC: 107 MMOL/L (ref 98–112)
CO2 SERPL-SCNC: 26 MMOL/L (ref 21–32)
CREAT BLD-MCNC: 0.8 MG/DL
EOSINOPHIL # BLD AUTO: 0.16 X10(3) UL (ref 0–0.7)
EOSINOPHIL NFR BLD AUTO: 1.4 %
ERYTHROCYTE [DISTWIDTH] IN BLOOD BY AUTOMATED COUNT: 13.3 %
GFR SERPLBLD BASED ON 1.73 SQ M-ARVRAT: 77 ML/MIN/1.73M2 (ref 60–?)
GLOBULIN PLAS-MCNC: 3.3 G/DL (ref 2.8–4.4)
GLUCOSE BLD-MCNC: 96 MG/DL (ref 70–99)
HCT VFR BLD AUTO: 43.3 %
HGB BLD-MCNC: 14.8 G/DL
IMM GRANULOCYTES # BLD AUTO: 0.05 X10(3) UL (ref 0–1)
IMM GRANULOCYTES NFR BLD: 0.4 %
LYMPHOCYTES # BLD AUTO: 4.57 X10(3) UL (ref 1–4)
LYMPHOCYTES NFR BLD AUTO: 39.2 %
MCH RBC QN AUTO: 34.6 PG (ref 26–34)
MCHC RBC AUTO-ENTMCNC: 34.2 G/DL (ref 31–37)
MCV RBC AUTO: 101.2 FL
MONOCYTES # BLD AUTO: 0.83 X10(3) UL (ref 0.1–1)
MONOCYTES NFR BLD AUTO: 7.1 %
NEUTROPHILS # BLD AUTO: 5.97 X10 (3) UL (ref 1.5–7.7)
NEUTROPHILS # BLD AUTO: 5.97 X10(3) UL (ref 1.5–7.7)
NEUTROPHILS NFR BLD AUTO: 51.2 %
OSMOLALITY SERPL CALC.SUM OF ELEC: 283 MOSM/KG (ref 275–295)
PLATELET # BLD AUTO: 406 10(3)UL (ref 150–450)
POTASSIUM SERPL-SCNC: 3.8 MMOL/L (ref 3.5–5.1)
PROT SERPL-MCNC: 7.2 G/DL (ref 6.4–8.2)
RBC # BLD AUTO: 4.28 X10(6)UL
SODIUM SERPL-SCNC: 137 MMOL/L (ref 136–145)
T4 FREE SERPL-MCNC: 0.9 NG/DL (ref 0.8–1.7)
TSI SER-ACNC: 1.97 MIU/ML (ref 0.36–3.74)
WBC # BLD AUTO: 11.7 X10(3) UL (ref 4–11)

## 2022-11-17 PROCEDURE — 99215 OFFICE O/P EST HI 40 MIN: CPT | Performed by: INTERNAL MEDICINE

## 2022-11-17 PROCEDURE — 96413 CHEMO IV INFUSION 1 HR: CPT

## 2022-11-17 PROCEDURE — S0028 INJECTION, FAMOTIDINE, 20 MG: HCPCS | Performed by: INTERNAL MEDICINE

## 2022-11-17 PROCEDURE — 96375 TX/PRO/DX INJ NEW DRUG ADDON: CPT

## 2022-11-17 RX ORDER — FAMOTIDINE 10 MG/ML
20 INJECTION, SOLUTION INTRAVENOUS ONCE
Status: COMPLETED | OUTPATIENT
Start: 2022-11-17 | End: 2022-11-17

## 2022-11-17 RX ORDER — PREDNISONE 10 MG/1
10 TABLET ORAL DAILY
Qty: 30 TABLET | Refills: 0 | Status: CANCELLED | OUTPATIENT
Start: 2022-11-17

## 2022-11-17 RX ORDER — FAMOTIDINE 10 MG/ML
20 INJECTION, SOLUTION INTRAVENOUS ONCE
Status: CANCELLED
Start: 2022-11-17 | End: 2022-11-17

## 2022-11-17 RX ORDER — PREDNISONE 1 MG/1
10 TABLET ORAL DAILY
Qty: 180 TABLET | Refills: 3 | Status: SHIPPED | OUTPATIENT
Start: 2022-11-17 | End: 2023-11-12

## 2022-11-17 RX ADMIN — FAMOTIDINE 20 MG: 10 INJECTION, SOLUTION INTRAVENOUS at 12:29:00

## 2022-11-17 NOTE — PROGRESS NOTES
Outpatient Oncology Care Plan   Problem list:   fatigue   Problems related to:   side effect of treatment   Interventions:   provided general teaching   Expected outcomes:   understands plan of care   Progress towards outcome: making progress     Education Record   Learner: Patient   Barriers / Limitations: None   Method: Discussion   Outcome: Shows understanding   Comments:  Patient here for follow-up and planned C3D1 Keytruda. Had a recent rash with previous cycle. Remains on prednisone and Zyrtec as of now. States she noticed some swelling to the left side of her neck that responds to the use of prednisone.

## 2022-11-17 NOTE — PROGRESS NOTES
Pt here for C3D1. Arrives Ambulating independently, accompanied by Self           Pregnancy screening: Not applicable    Modifications in dose or schedule: No    Drugs/infusions dual verified for appearance and physical integrity. IV pump settings were dual verified: yes     Frequency of blood return and site check throughout administration: Prior to administration   Discharged to Home, Ambulating independently, accompanied by:Self    Outpatient Oncology Care Plan  Problem list:  knowledge deficit  Problems related to:  side effect of treatment  Interventions:  monitor effect of therapy  Expected outcomes:  understands plan of care  Progress towards outcome:  making progress    Education Record    Learner:  Patient  Barriers / Limitations:  None  Method:  Discussion and Printed material  Outcome:  Shows understanding  Comments:    Here for treatment. Feeling well. Still has rash in days after infusion. On zyrtec and low dose steroid at home from MD and given IV pepcid prior to infusion. Tolerated infusion well, no complaints during. Left in stable condition. Next appt made.

## 2022-12-08 ENCOUNTER — SOCIAL WORK SERVICES (OUTPATIENT)
Dept: HEMATOLOGY/ONCOLOGY | Facility: HOSPITAL | Age: 72
End: 2022-12-08

## 2022-12-08 ENCOUNTER — OFFICE VISIT (OUTPATIENT)
Dept: HEMATOLOGY/ONCOLOGY | Facility: HOSPITAL | Age: 72
End: 2022-12-08
Attending: INTERNAL MEDICINE
Payer: MEDICARE

## 2022-12-08 VITALS
RESPIRATION RATE: 18 BRPM | TEMPERATURE: 97 F | DIASTOLIC BLOOD PRESSURE: 98 MMHG | HEIGHT: 62.01 IN | WEIGHT: 139 LBS | SYSTOLIC BLOOD PRESSURE: 143 MMHG | OXYGEN SATURATION: 99 % | BODY MASS INDEX: 25.26 KG/M2 | HEART RATE: 95 BPM

## 2022-12-08 DIAGNOSIS — Z85.820 PERSONAL HISTORY OF MALIGNANT MELANOMA OF SKIN: Primary | ICD-10-CM

## 2022-12-08 DIAGNOSIS — C43.9 METASTATIC MELANOMA (HCC): Primary | ICD-10-CM

## 2022-12-08 LAB
ALBUMIN SERPL-MCNC: 3.8 G/DL (ref 3.4–5)
ALBUMIN/GLOB SERPL: 1 {RATIO} (ref 1–2)
ALP LIVER SERPL-CCNC: 75 U/L
ALT SERPL-CCNC: 44 U/L
ANION GAP SERPL CALC-SCNC: 6 MMOL/L (ref 0–18)
AST SERPL-CCNC: 32 U/L (ref 15–37)
BASOPHILS # BLD AUTO: 0.07 X10(3) UL (ref 0–0.2)
BASOPHILS NFR BLD AUTO: 0.7 %
BILIRUB SERPL-MCNC: 0.3 MG/DL (ref 0.1–2)
BUN BLD-MCNC: 10 MG/DL (ref 7–18)
CALCIUM BLD-MCNC: 9.1 MG/DL (ref 8.5–10.1)
CHLORIDE SERPL-SCNC: 107 MMOL/L (ref 98–112)
CO2 SERPL-SCNC: 25 MMOL/L (ref 21–32)
CREAT BLD-MCNC: 0.89 MG/DL
EOSINOPHIL # BLD AUTO: 0.14 X10(3) UL (ref 0–0.7)
EOSINOPHIL NFR BLD AUTO: 1.4 %
ERYTHROCYTE [DISTWIDTH] IN BLOOD BY AUTOMATED COUNT: 13 %
GFR SERPLBLD BASED ON 1.73 SQ M-ARVRAT: 68 ML/MIN/1.73M2 (ref 60–?)
GLOBULIN PLAS-MCNC: 3.7 G/DL (ref 2.8–4.4)
GLUCOSE BLD-MCNC: 90 MG/DL (ref 70–99)
HCT VFR BLD AUTO: 43.2 %
HGB BLD-MCNC: 14.8 G/DL
IMM GRANULOCYTES # BLD AUTO: 0.05 X10(3) UL (ref 0–1)
IMM GRANULOCYTES NFR BLD: 0.5 %
LYMPHOCYTES # BLD AUTO: 3.47 X10(3) UL (ref 1–4)
LYMPHOCYTES NFR BLD AUTO: 34.1 %
MCH RBC QN AUTO: 34.8 PG (ref 26–34)
MCHC RBC AUTO-ENTMCNC: 34.3 G/DL (ref 31–37)
MCV RBC AUTO: 101.6 FL
MONOCYTES # BLD AUTO: 0.73 X10(3) UL (ref 0.1–1)
MONOCYTES NFR BLD AUTO: 7.2 %
NEUTROPHILS # BLD AUTO: 5.73 X10 (3) UL (ref 1.5–7.7)
NEUTROPHILS # BLD AUTO: 5.73 X10(3) UL (ref 1.5–7.7)
NEUTROPHILS NFR BLD AUTO: 56.1 %
OSMOLALITY SERPL CALC.SUM OF ELEC: 285 MOSM/KG (ref 275–295)
PLATELET # BLD AUTO: 424 10(3)UL (ref 150–450)
POTASSIUM SERPL-SCNC: 3.7 MMOL/L (ref 3.5–5.1)
PROT SERPL-MCNC: 7.5 G/DL (ref 6.4–8.2)
RBC # BLD AUTO: 4.25 X10(6)UL
SODIUM SERPL-SCNC: 138 MMOL/L (ref 136–145)
WBC # BLD AUTO: 10.2 X10(3) UL (ref 4–11)

## 2022-12-08 PROCEDURE — 99215 OFFICE O/P EST HI 40 MIN: CPT | Performed by: INTERNAL MEDICINE

## 2022-12-08 PROCEDURE — 96375 TX/PRO/DX INJ NEW DRUG ADDON: CPT

## 2022-12-08 PROCEDURE — S0028 INJECTION, FAMOTIDINE, 20 MG: HCPCS | Performed by: INTERNAL MEDICINE

## 2022-12-08 PROCEDURE — 96413 CHEMO IV INFUSION 1 HR: CPT

## 2022-12-08 RX ORDER — PREDNISONE 20 MG/1
10 TABLET ORAL ONCE
Status: COMPLETED | OUTPATIENT
Start: 2022-12-08 | End: 2022-12-08

## 2022-12-08 RX ORDER — FAMOTIDINE 10 MG/ML
20 INJECTION, SOLUTION INTRAVENOUS ONCE
Status: COMPLETED | OUTPATIENT
Start: 2022-12-08 | End: 2022-12-08

## 2022-12-08 RX ORDER — PREDNISONE 20 MG/1
10 TABLET ORAL ONCE
Status: CANCELLED
Start: 2022-12-08 | End: 2022-12-08

## 2022-12-08 RX ORDER — FAMOTIDINE 10 MG/ML
20 INJECTION, SOLUTION INTRAVENOUS ONCE
Status: CANCELLED
Start: 2022-12-08 | End: 2022-12-08

## 2022-12-08 RX ADMIN — FAMOTIDINE 20 MG: 10 INJECTION, SOLUTION INTRAVENOUS at 11:50:00

## 2022-12-08 RX ADMIN — PREDNISONE 10 MG: 20 TABLET ORAL at 11:39:00

## 2022-12-08 NOTE — PROGRESS NOTES
Pt here for C4D1 Keytruda. Arrives Ambulating independently, accompanied by Self           Pregnancy screening: Not applicable    Modifications in dose or schedule: no    Drugs/infusions dual verified for appearance and physical integrity. IV pump settings were dual verified: yes     Frequency of blood return and site check throughout administration: Prior to administration and At completion of therapy   Discharged to Home, Ambulating independently, accompanied by:Self    Outpatient Oncology Care Plan  Problem list:  rash  Problems related to:  side effect of treatment  Interventions:  encourage activity as tolerated  maintain skin integrity  Expected outcomes:  adequate sleep/rest  optimal lab values  symptoms relieved/minimized  Progress towards outcome:  making progress    Education Record    Learner:  Patient  Barriers / Limitations:  None  Method:  Brief focused, Discussion and Reinforcement  Outcome:  Shows understanding  Comments: Patient has a rash still present from previous [place] Prednisone and pepcid given as ordered. Patient tolerated infusion w/o complaint. Understands to call with any questions or concerns.

## 2022-12-08 NOTE — PROGRESS NOTES
met with patient and completed Letter, sending to [address] K#[phone], to patient directly in person, and via [address] Madison Metz is a patient under my care at [facility] for her diagnosis of Metastatic Melanoma. She is currently undergoing Keytruda treatment in order to activate cells in the body to kill the cancerous cells.

## 2022-12-08 NOTE — PROGRESS NOTES
Education Record    Learner:  Patient    Disease / Diagnosis: melanoma  OTV Keytruda    Barriers / Limitations:  None   Comments:    Method:  Discussion   Comments:    General Topics:  Plan of care reviewed   Comments:    Outcome:  Shows understanding   Comments:for rash  Taking prednisone 5mg, last 2 days, held for 3-4 days to assess rash, it got worse when she was off, so she resumed. Takes zyrtec daily   Energy level is stable, denies, cough/ SOB, abdominal pain, diarrhea.

## 2022-12-15 ENCOUNTER — HOSPITAL ENCOUNTER (OUTPATIENT)
Dept: NUCLEAR MEDICINE | Facility: HOSPITAL | Age: 72
Discharge: HOME | End: 2022-12-15
Attending: INTERNAL MEDICINE
Payer: MEDICARE

## 2022-12-15 DIAGNOSIS — Z85.820 PERSONAL HISTORY OF MALIGNANT MELANOMA OF SKIN: ICD-10-CM

## 2022-12-15 LAB — GLUCOSE BLD-MCNC: 107 MG/DL (ref 70–99)

## 2022-12-15 PROCEDURE — 82962 GLUCOSE BLOOD TEST: CPT

## 2022-12-15 PROCEDURE — 78816 PET IMAGE W/CT FULL BODY: CPT | Performed by: INTERNAL MEDICINE

## 2022-12-29 ENCOUNTER — OFFICE VISIT (OUTPATIENT)
Dept: HEMATOLOGY/ONCOLOGY | Facility: HOSPITAL | Age: 72
End: 2022-12-29
Attending: INTERNAL MEDICINE
Payer: MEDICARE

## 2022-12-29 ENCOUNTER — SOCIAL WORK SERVICES (OUTPATIENT)
Dept: HEMATOLOGY/ONCOLOGY | Facility: HOSPITAL | Age: 72
End: 2022-12-29

## 2022-12-29 VITALS
BODY MASS INDEX: 25.71 KG/M2 | TEMPERATURE: 98 F | SYSTOLIC BLOOD PRESSURE: 147 MMHG | DIASTOLIC BLOOD PRESSURE: 91 MMHG | HEIGHT: 62.01 IN | RESPIRATION RATE: 18 BRPM | OXYGEN SATURATION: 97 % | HEART RATE: 88 BPM | WEIGHT: 141.5 LBS

## 2022-12-29 DIAGNOSIS — Z85.820 PERSONAL HISTORY OF MALIGNANT MELANOMA OF SKIN: Primary | ICD-10-CM

## 2022-12-29 LAB
ALBUMIN SERPL-MCNC: 3.8 G/DL (ref 3.4–5)
ALBUMIN/GLOB SERPL: 1 {RATIO} (ref 1–2)
ALP LIVER SERPL-CCNC: 78 U/L
ALT SERPL-CCNC: 59 U/L
ANION GAP SERPL CALC-SCNC: 7 MMOL/L (ref 0–18)
AST SERPL-CCNC: 43 U/L (ref 15–37)
BASOPHILS # BLD AUTO: 0.05 X10(3) UL (ref 0–0.2)
BASOPHILS NFR BLD AUTO: 0.4 %
BILIRUB SERPL-MCNC: 0.4 MG/DL (ref 0.1–2)
BUN BLD-MCNC: 13 MG/DL (ref 7–18)
CALCIUM BLD-MCNC: 9.2 MG/DL (ref 8.5–10.1)
CHLORIDE SERPL-SCNC: 108 MMOL/L (ref 98–112)
CO2 SERPL-SCNC: 23 MMOL/L (ref 21–32)
CREAT BLD-MCNC: 0.82 MG/DL
EOSINOPHIL # BLD AUTO: 0.03 X10(3) UL (ref 0–0.7)
EOSINOPHIL NFR BLD AUTO: 0.3 %
ERYTHROCYTE [DISTWIDTH] IN BLOOD BY AUTOMATED COUNT: 13.2 %
FASTING STATUS PATIENT QL REPORTED: NO
GFR SERPLBLD BASED ON 1.73 SQ M-ARVRAT: 75 ML/MIN/1.73M2 (ref 60–?)
GLOBULIN PLAS-MCNC: 3.8 G/DL (ref 2.8–4.4)
GLUCOSE BLD-MCNC: 101 MG/DL (ref 70–99)
HCT VFR BLD AUTO: 45.3 %
HGB BLD-MCNC: 15.6 G/DL
IMM GRANULOCYTES # BLD AUTO: 0.06 X10(3) UL (ref 0–1)
IMM GRANULOCYTES NFR BLD: 0.5 %
LYMPHOCYTES # BLD AUTO: 2.06 X10(3) UL (ref 1–4)
LYMPHOCYTES NFR BLD AUTO: 17.2 %
MCH RBC QN AUTO: 34.9 PG (ref 26–34)
MCHC RBC AUTO-ENTMCNC: 34.4 G/DL (ref 31–37)
MCV RBC AUTO: 101.3 FL
MONOCYTES # BLD AUTO: 0.47 X10(3) UL (ref 0.1–1)
MONOCYTES NFR BLD AUTO: 3.9 %
NEUTROPHILS # BLD AUTO: 9.29 X10 (3) UL (ref 1.5–7.7)
NEUTROPHILS # BLD AUTO: 9.29 X10(3) UL (ref 1.5–7.7)
NEUTROPHILS NFR BLD AUTO: 77.7 %
OSMOLALITY SERPL CALC.SUM OF ELEC: 286 MOSM/KG (ref 275–295)
PLATELET # BLD AUTO: 387 10(3)UL (ref 150–450)
POTASSIUM SERPL-SCNC: 3.9 MMOL/L (ref 3.5–5.1)
PROT SERPL-MCNC: 7.6 G/DL (ref 6.4–8.2)
RBC # BLD AUTO: 4.47 X10(6)UL
SODIUM SERPL-SCNC: 138 MMOL/L (ref 136–145)
T4 FREE SERPL-MCNC: 0.8 NG/DL (ref 0.8–1.7)
TSI SER-ACNC: 1.63 MIU/ML (ref 0.36–3.74)
WBC # BLD AUTO: 12 X10(3) UL (ref 4–11)

## 2022-12-29 PROCEDURE — 99215 OFFICE O/P EST HI 40 MIN: CPT | Performed by: INTERNAL MEDICINE

## 2022-12-29 PROCEDURE — 96375 TX/PRO/DX INJ NEW DRUG ADDON: CPT

## 2022-12-29 PROCEDURE — S0028 INJECTION, FAMOTIDINE, 20 MG: HCPCS | Performed by: INTERNAL MEDICINE

## 2022-12-29 PROCEDURE — 96413 CHEMO IV INFUSION 1 HR: CPT

## 2022-12-29 RX ORDER — FAMOTIDINE 10 MG/ML
20 INJECTION, SOLUTION INTRAVENOUS ONCE
Status: CANCELLED
Start: 2022-12-29 | End: 2022-12-29

## 2022-12-29 RX ORDER — PREDNISONE 20 MG/1
20 TABLET ORAL DAILY
Status: DISCONTINUED | OUTPATIENT
Start: 2022-12-29 | End: 2022-12-29

## 2022-12-29 RX ORDER — PREDNISONE 20 MG/1
20 TABLET ORAL DAILY
Status: CANCELLED
Start: 2022-12-29

## 2022-12-29 RX ORDER — FAMOTIDINE 10 MG/ML
20 INJECTION, SOLUTION INTRAVENOUS ONCE
Status: COMPLETED | OUTPATIENT
Start: 2022-12-29 | End: 2022-12-29

## 2022-12-29 RX ADMIN — FAMOTIDINE 20 MG: 10 INJECTION, SOLUTION INTRAVENOUS at 12:37:00

## 2022-12-29 NOTE — PROGRESS NOTES
Outpatient Oncology Care Plan   Problem list:   fatigue   Problems related to:   side effect of treatment   Interventions:   provided general teaching   Expected outcomes:   understands plan of care   Progress towards outcome: making progress     Education Record   Learner: Patient   Barriers / Limitations: None   Method: Discussion   Outcome: Shows understanding   Comments:  Patient here for follow-up and possible C5D1 Keytruda. Still has a rash that comes and goes despite taking 10 mg of prednisone and Zyrtec daily. Recently had PET scan performed.

## 2022-12-29 NOTE — PROGRESS NOTES
Pt here for C 5 D 1 Keytruda. Arrives Ambulating independently, accompanied by Self           Pregnancy screening: Not applicable    Modifications in dose or schedule: No    Drugs/infusions dual verified for appearance and physical integrity.  IV pump settings were dual verified: yes     Frequency of blood return and site check throughout administration: Prior to administration and At completion of therapy   Discharged to Home, Ambulating independently, accompanied by:Self    Outpatient Oncology Care Plan  Problem list:  fatigue  knowledge deficit  Problems related to:  chemotherapy  Interventions:  monitor effect of therapy  monitor lab values  promoted rest  Expected outcomes:  safe in environment  symptoms relieved/minimized  understands plan of care  verbalize how to care for self  Progress towards outcome:  unchanged    Education Record    Learner:  Patient  Barriers / Limitations:  None  Method:  Discussion  Outcome:  Shows understanding  Comments:

## 2022-12-29 NOTE — PROGRESS NOTES
faxed notes from 22 and 22 to [employer] X#[phone] and provided copies to patient in person.    ( 10/20/1942, [facility] [address] [address], Claim # [id number], Policy No. [id number])

## 2023-01-19 ENCOUNTER — OFFICE VISIT (OUTPATIENT)
Dept: HEMATOLOGY/ONCOLOGY | Facility: HOSPITAL | Age: 73
End: 2023-01-19
Attending: INTERNAL MEDICINE
Payer: MEDICARE

## 2023-01-19 ENCOUNTER — SOCIAL WORK SERVICES (OUTPATIENT)
Dept: HEMATOLOGY/ONCOLOGY | Facility: HOSPITAL | Age: 73
End: 2023-01-19

## 2023-01-19 ENCOUNTER — TELEPHONE (OUTPATIENT)
Dept: SURGERY | Facility: CLINIC | Age: 73
End: 2023-01-19

## 2023-01-19 VITALS — SYSTOLIC BLOOD PRESSURE: 153 MMHG | HEART RATE: 53 BPM | DIASTOLIC BLOOD PRESSURE: 84 MMHG

## 2023-01-19 VITALS
OXYGEN SATURATION: 98 % | SYSTOLIC BLOOD PRESSURE: 163 MMHG | TEMPERATURE: 97 F | RESPIRATION RATE: 18 BRPM | HEART RATE: 102 BPM | HEIGHT: 62.01 IN | WEIGHT: 143 LBS | BODY MASS INDEX: 25.98 KG/M2 | DIASTOLIC BLOOD PRESSURE: 94 MMHG

## 2023-01-19 DIAGNOSIS — Z85.820 PERSONAL HISTORY OF MALIGNANT MELANOMA OF SKIN: Primary | ICD-10-CM

## 2023-01-19 LAB
ALBUMIN SERPL-MCNC: 4 G/DL (ref 3.4–5)
ALBUMIN/GLOB SERPL: 1.1 {RATIO} (ref 1–2)
ALP LIVER SERPL-CCNC: 75 U/L
ALT SERPL-CCNC: 63 U/L
ANION GAP SERPL CALC-SCNC: 6 MMOL/L (ref 0–18)
AST SERPL-CCNC: 43 U/L (ref 15–37)
BASOPHILS # BLD AUTO: 0.07 X10(3) UL (ref 0–0.2)
BASOPHILS NFR BLD AUTO: 0.5 %
BILIRUB SERPL-MCNC: 0.3 MG/DL (ref 0.1–2)
BUN BLD-MCNC: 15 MG/DL (ref 7–18)
CALCIUM BLD-MCNC: 9.3 MG/DL (ref 8.5–10.1)
CHLORIDE SERPL-SCNC: 106 MMOL/L (ref 98–112)
CO2 SERPL-SCNC: 25 MMOL/L (ref 21–32)
CREAT BLD-MCNC: 0.87 MG/DL
EOSINOPHIL # BLD AUTO: 0.07 X10(3) UL (ref 0–0.7)
EOSINOPHIL NFR BLD AUTO: 0.5 %
ERYTHROCYTE [DISTWIDTH] IN BLOOD BY AUTOMATED COUNT: 13 %
GFR SERPLBLD BASED ON 1.73 SQ M-ARVRAT: 70 ML/MIN/1.73M2 (ref 60–?)
GLOBULIN PLAS-MCNC: 3.5 G/DL (ref 2.8–4.4)
GLUCOSE BLD-MCNC: 100 MG/DL (ref 70–99)
HCT VFR BLD AUTO: 44.1 %
HGB BLD-MCNC: 15.2 G/DL
IMM GRANULOCYTES # BLD AUTO: 0.05 X10(3) UL (ref 0–1)
IMM GRANULOCYTES NFR BLD: 0.4 %
LYMPHOCYTES # BLD AUTO: 2.91 X10(3) UL (ref 1–4)
LYMPHOCYTES NFR BLD AUTO: 22.4 %
MCH RBC QN AUTO: 35.1 PG (ref 26–34)
MCHC RBC AUTO-ENTMCNC: 34.5 G/DL (ref 31–37)
MCV RBC AUTO: 101.8 FL
MONOCYTES # BLD AUTO: 0.67 X10(3) UL (ref 0.1–1)
MONOCYTES NFR BLD AUTO: 5.2 %
NEUTROPHILS # BLD AUTO: 9.23 X10 (3) UL (ref 1.5–7.7)
NEUTROPHILS # BLD AUTO: 9.23 X10(3) UL (ref 1.5–7.7)
NEUTROPHILS NFR BLD AUTO: 71 %
OSMOLALITY SERPL CALC.SUM OF ELEC: 285 MOSM/KG (ref 275–295)
PLATELET # BLD AUTO: 385 10(3)UL (ref 150–450)
POTASSIUM SERPL-SCNC: 3.3 MMOL/L (ref 3.5–5.1)
PROT SERPL-MCNC: 7.5 G/DL (ref 6.4–8.2)
RBC # BLD AUTO: 4.33 X10(6)UL
SODIUM SERPL-SCNC: 137 MMOL/L (ref 136–145)
WBC # BLD AUTO: 13 X10(3) UL (ref 4–11)

## 2023-01-19 PROCEDURE — S0028 INJECTION, FAMOTIDINE, 20 MG: HCPCS | Performed by: INTERNAL MEDICINE

## 2023-01-19 PROCEDURE — 96413 CHEMO IV INFUSION 1 HR: CPT

## 2023-01-19 PROCEDURE — 96375 TX/PRO/DX INJ NEW DRUG ADDON: CPT

## 2023-01-19 PROCEDURE — 99215 OFFICE O/P EST HI 40 MIN: CPT | Performed by: INTERNAL MEDICINE

## 2023-01-19 RX ORDER — FAMOTIDINE 10 MG/ML
20 INJECTION, SOLUTION INTRAVENOUS ONCE
Status: COMPLETED | OUTPATIENT
Start: 2023-01-19 | End: 2023-01-19

## 2023-01-19 RX ORDER — FAMOTIDINE 10 MG/ML
20 INJECTION, SOLUTION INTRAVENOUS ONCE
Status: CANCELLED
Start: 2023-01-19 | End: 2023-01-19

## 2023-01-19 RX ADMIN — FAMOTIDINE 20 MG: 10 INJECTION, SOLUTION INTRAVENOUS at 12:21:00

## 2023-01-19 NOTE — PROGRESS NOTES
Outpatient Oncology Care Plan  Problem list:  Rash    Problems related to:    chemotherapy    Interventions:  patient/family supported    Expected outcomes:  patient supported/coping well  safe in environment  symptoms relieved/minimized    Progress towards outcome:  making progress    Education Record    Learner:  Patient  Barriers / Limitations:  None  Method:  Discussion  Outcome:  Shows understanding  Comments:    Here for cycle 6 Keytruda. Has rash since starting immunotherapy. Since starting immunotherapy. Now has bumps, which it didn't have before. However, today it seems down a little bit per patient report. Not painful, but itchy at times. Took 10mg prednisone today. Takes zyrtec daily.

## 2023-01-19 NOTE — TELEPHONE ENCOUNTER
I received a fax from [facility] pathology requesting slides back that we received on aug 24, 2022. I called [facility] and they will mail it back either today or tomorrow. I  Notified [facility] regarding above.

## 2023-01-19 NOTE — PROGRESS NOTES
Pt here for Xiomara Fried. Arrives Ambulating independently, accompanied by Self           Pregnancy screening: Not applicable    Modifications in dose or schedule: no    Drugs/infusions dual verified for appearance and physical integrity.  IV pump settings were dual verified: yes     Frequency of blood return and site check throughout administration: Prior to administration and At completion of therapy   Discharged to Home, Ambulating independently, accompanied by:Self    Outpatient Oncology Care Plan  Problem list:  knowledge deficit  Problems related to:  side effect of treatment  Interventions:  encourage activity as tolerated  maintain skin integrity  Expected outcomes:  adequate sleep/rest  optimal lab values  symptoms relieved/minimized  Progress towards outcome:  making progress    Education Record    Learner:  Patient  Barriers / Limitations:  None  Method:  Brief focused and Discussion  Outcome:  Shows understanding  Comments:

## 2023-01-19 NOTE — PROGRESS NOTES
faxed notes from 22 and 23 to Magdy MARTÍNEZ#[phone] and provided copies to patient in via mail to her home address.    ( 10/20/1942, [address] [place] [address], Claim # [id number], Policy No. [id number])

## 2023-01-24 ENCOUNTER — SOCIAL WORK SERVICES (OUTPATIENT)
Dept: HEMATOLOGY/ONCOLOGY | Facility: HOSPITAL | Age: 73
End: 2023-01-24

## 2023-01-24 NOTE — PROGRESS NOTES
faxed notes from 22 to [facility] Leyda ORTIZ#[phone]   ( 10/20/1942, [address] [place] [address], [place] [address], Claim # [id number], Policy No. [id number])

## 2023-02-06 ENCOUNTER — SOCIAL WORK SERVICES (OUTPATIENT)
Dept: HEMATOLOGY/ONCOLOGY | Facility: HOSPITAL | Age: 73
End: 2023-02-06

## 2023-02-06 NOTE — PROGRESS NOTES
received request to resend documents from 23 treatment; Pepcid treatment clearly marked and refaxed to [address] F# [phone] as requested.    ( 10/20/1942, [address] [place] [address], Claim # [id number], Policy No. [id number])

## 2023-02-10 ENCOUNTER — OFFICE VISIT (OUTPATIENT)
Dept: HEMATOLOGY/ONCOLOGY | Facility: HOSPITAL | Age: 73
End: 2023-02-10
Attending: INTERNAL MEDICINE
Payer: MEDICARE

## 2023-02-10 ENCOUNTER — OFFICE VISIT (OUTPATIENT)
Dept: SURGERY | Facility: CLINIC | Age: 73
End: 2023-02-10
Payer: MEDICARE

## 2023-02-10 VITALS
HEIGHT: 62.01 IN | SYSTOLIC BLOOD PRESSURE: 150 MMHG | TEMPERATURE: 97 F | RESPIRATION RATE: 20 BRPM | WEIGHT: 139.5 LBS | BODY MASS INDEX: 25.35 KG/M2 | DIASTOLIC BLOOD PRESSURE: 81 MMHG | HEART RATE: 86 BPM | OXYGEN SATURATION: 99 %

## 2023-02-10 VITALS
DIASTOLIC BLOOD PRESSURE: 81 MMHG | HEART RATE: 95 BPM | WEIGHT: 139 LBS | HEIGHT: 62.01 IN | SYSTOLIC BLOOD PRESSURE: 147 MMHG | RESPIRATION RATE: 16 BRPM | TEMPERATURE: 98 F | BODY MASS INDEX: 25.26 KG/M2 | OXYGEN SATURATION: 99 %

## 2023-02-10 DIAGNOSIS — Z85.820 PERSONAL HISTORY OF MALIGNANT MELANOMA OF SKIN: ICD-10-CM

## 2023-02-10 DIAGNOSIS — C43.9 MALIGNANT MELANOMA, METASTATIC (HCC): Primary | ICD-10-CM

## 2023-02-10 DIAGNOSIS — Z85.820 PERSONAL HISTORY OF MALIGNANT MELANOMA OF SKIN: Primary | ICD-10-CM

## 2023-02-10 LAB
ALBUMIN SERPL-MCNC: 4.3 G/DL (ref 3.4–5)
ALBUMIN/GLOB SERPL: 1.4 {RATIO} (ref 1–2)
ALP LIVER SERPL-CCNC: 73 U/L
ALT SERPL-CCNC: 83 U/L
ANION GAP SERPL CALC-SCNC: 9 MMOL/L (ref 0–18)
AST SERPL-CCNC: 63 U/L (ref 15–37)
BASOPHILS # BLD AUTO: 0.07 X10(3) UL (ref 0–0.2)
BASOPHILS NFR BLD AUTO: 0.5 %
BILIRUB SERPL-MCNC: 0.3 MG/DL (ref 0.1–2)
BUN BLD-MCNC: 8 MG/DL (ref 7–18)
CALCIUM BLD-MCNC: 9.5 MG/DL (ref 8.5–10.1)
CHLORIDE SERPL-SCNC: 106 MMOL/L (ref 98–112)
CO2 SERPL-SCNC: 23 MMOL/L (ref 21–32)
CREAT BLD-MCNC: 0.91 MG/DL
EOSINOPHIL # BLD AUTO: 0.05 X10(3) UL (ref 0–0.7)
EOSINOPHIL NFR BLD AUTO: 0.4 %
ERYTHROCYTE [DISTWIDTH] IN BLOOD BY AUTOMATED COUNT: 12.6 %
GFR SERPLBLD BASED ON 1.73 SQ M-ARVRAT: 66 ML/MIN/1.73M2 (ref 60–?)
GLOBULIN PLAS-MCNC: 3.1 G/DL (ref 2.8–4.4)
GLUCOSE BLD-MCNC: 107 MG/DL (ref 70–99)
HCT VFR BLD AUTO: 42.8 %
HGB BLD-MCNC: 14.8 G/DL
IMM GRANULOCYTES # BLD AUTO: 0.05 X10(3) UL (ref 0–1)
IMM GRANULOCYTES NFR BLD: 0.4 %
LYMPHOCYTES # BLD AUTO: 2.63 X10(3) UL (ref 1–4)
LYMPHOCYTES NFR BLD AUTO: 19.6 %
MCH RBC QN AUTO: 35.2 PG (ref 26–34)
MCHC RBC AUTO-ENTMCNC: 34.6 G/DL (ref 31–37)
MCV RBC AUTO: 101.7 FL
MONOCYTES # BLD AUTO: 0.61 X10(3) UL (ref 0.1–1)
MONOCYTES NFR BLD AUTO: 4.5 %
NEUTROPHILS # BLD AUTO: 10.02 X10 (3) UL (ref 1.5–7.7)
NEUTROPHILS # BLD AUTO: 10.02 X10(3) UL (ref 1.5–7.7)
NEUTROPHILS NFR BLD AUTO: 74.6 %
OSMOLALITY SERPL CALC.SUM OF ELEC: 285 MOSM/KG (ref 275–295)
PLATELET # BLD AUTO: 420 10(3)UL (ref 150–450)
POTASSIUM SERPL-SCNC: 3.5 MMOL/L (ref 3.5–5.1)
PROT SERPL-MCNC: 7.4 G/DL (ref 6.4–8.2)
RBC # BLD AUTO: 4.21 X10(6)UL
SODIUM SERPL-SCNC: 138 MMOL/L (ref 136–145)
T4 FREE SERPL-MCNC: 0.8 NG/DL (ref 0.8–1.7)
TSI SER-ACNC: 2.4 MIU/ML (ref 0.36–3.74)
WBC # BLD AUTO: 13.4 X10(3) UL (ref 4–11)

## 2023-02-10 PROCEDURE — 99214 OFFICE O/P EST MOD 30 MIN: CPT | Performed by: SURGERY

## 2023-02-10 PROCEDURE — 96375 TX/PRO/DX INJ NEW DRUG ADDON: CPT

## 2023-02-10 PROCEDURE — 96413 CHEMO IV INFUSION 1 HR: CPT

## 2023-02-10 PROCEDURE — S0028 INJECTION, FAMOTIDINE, 20 MG: HCPCS | Performed by: INTERNAL MEDICINE

## 2023-02-10 PROCEDURE — 99215 OFFICE O/P EST HI 40 MIN: CPT | Performed by: INTERNAL MEDICINE

## 2023-02-10 RX ORDER — FAMOTIDINE 10 MG/ML
20 INJECTION, SOLUTION INTRAVENOUS ONCE
Status: COMPLETED | OUTPATIENT
Start: 2023-02-10 | End: 2023-02-10

## 2023-02-10 RX ADMIN — FAMOTIDINE 20 MG: 10 INJECTION, SOLUTION INTRAVENOUS at 11:20:00

## 2023-02-10 NOTE — PROGRESS NOTES
Outpatient Oncology Care Plan   Problem list:   fatigue   Problems related to:   side effect of treatment   Interventions:   provided general teaching   Expected outcomes:   understands plan of care   Progress towards outcome: making progress     Education Record   Learner: Patient   Barriers / Limitations: None   Method: Discussion   Outcome: Shows understanding   Comments:  Patient here for follow-up and planned C7D1 treatment. States her rash remains unchanged. She held her steroid and Zyrtec last week to see how rash responded. States it got worse. Resumed prednisone 10 mg and zyrtec five days ago and rash started to improve again. Denies any additional symptoms at this time.

## 2023-02-10 NOTE — PROGRESS NOTES
Pt here for C7D1 of  Keytruda. Arrives Ambulating independently, accompanied by Self           Pregnancy screening: Not applicable    Modifications in dose or schedule: No    Drugs/infusions dual verified for appearance and physical integrity. IV pump settings were dual verified: yes     Frequency of blood return and site check throughout administration: Prior to administration   Discharged to Home, Ambulating independently, accompanied by:Self    Outpatient Oncology Care Plan  Problem list:  knowledge deficit  Problems related to:  chemotherapy  Interventions:  nausea/vomiting teaching  Expected outcomes:  optimal lab values  Progress towards outcome:  making progress    Education Record    Learner:  Patient  Barriers / Limitations:  None  Method:  Brief focused  Outcome:  Shows understanding  Comments: Tolerated well.  Given AVS

## 2023-03-01 ENCOUNTER — TELEPHONE (OUTPATIENT)
Dept: SURGERY | Facility: CLINIC | Age: 73
End: 2023-03-01

## 2023-03-01 NOTE — TELEPHONE ENCOUNTER
i called [facility] regarding path slides that need to be returned to original facility, they said a dr is holding them and he has been in contact with the other facility. i asked if i can fax the ppw to them and she said  yes so they ([facility]) will handle it.

## 2023-03-03 ENCOUNTER — OFFICE VISIT (OUTPATIENT)
Dept: HEMATOLOGY/ONCOLOGY | Facility: HOSPITAL | Age: 73
End: 2023-03-03
Attending: INTERNAL MEDICINE
Payer: MEDICARE

## 2023-03-03 ENCOUNTER — SOCIAL WORK SERVICES (OUTPATIENT)
Dept: HEMATOLOGY/ONCOLOGY | Facility: HOSPITAL | Age: 73
End: 2023-03-03

## 2023-03-03 VITALS
TEMPERATURE: 97 F | DIASTOLIC BLOOD PRESSURE: 86 MMHG | HEIGHT: 62.01 IN | RESPIRATION RATE: 16 BRPM | WEIGHT: 139.31 LBS | OXYGEN SATURATION: 96 % | BODY MASS INDEX: 25.31 KG/M2 | HEART RATE: 84 BPM | SYSTOLIC BLOOD PRESSURE: 139 MMHG

## 2023-03-03 DIAGNOSIS — Z85.820 PERSONAL HISTORY OF MALIGNANT MELANOMA OF SKIN: ICD-10-CM

## 2023-03-03 DIAGNOSIS — Z85.820 PERSONAL HISTORY OF MALIGNANT MELANOMA OF SKIN: Primary | ICD-10-CM

## 2023-03-03 DIAGNOSIS — R79.9 ABNORMAL FINDING OF BLOOD CHEMISTRY, UNSPECIFIED: ICD-10-CM

## 2023-03-03 LAB
ALBUMIN SERPL-MCNC: 3.8 G/DL (ref 3.4–5)
ALBUMIN/GLOB SERPL: 1.1 {RATIO} (ref 1–2)
ALP LIVER SERPL-CCNC: 74 U/L
ALT SERPL-CCNC: 59 U/L
ANION GAP SERPL CALC-SCNC: 4 MMOL/L (ref 0–18)
AST SERPL-CCNC: 35 U/L (ref 15–37)
BASOPHILS # BLD AUTO: 0.09 X10(3) UL (ref 0–0.2)
BASOPHILS NFR BLD AUTO: 0.7 %
BILIRUB SERPL-MCNC: 0.2 MG/DL (ref 0.1–2)
BUN BLD-MCNC: 11 MG/DL (ref 7–18)
CALCIUM BLD-MCNC: 9.2 MG/DL (ref 8.5–10.1)
CHLORIDE SERPL-SCNC: 110 MMOL/L (ref 98–112)
CO2 SERPL-SCNC: 24 MMOL/L (ref 21–32)
CREAT BLD-MCNC: 0.84 MG/DL
DEPRECATED HBV CORE AB SER IA-ACNC: 55.3 NG/ML
EOSINOPHIL # BLD AUTO: 0.05 X10(3) UL (ref 0–0.7)
EOSINOPHIL NFR BLD AUTO: 0.4 %
ERYTHROCYTE [DISTWIDTH] IN BLOOD BY AUTOMATED COUNT: 12.5 %
GFR SERPLBLD BASED ON 1.73 SQ M-ARVRAT: 73 ML/MIN/1.73M2 (ref 60–?)
GLOBULIN PLAS-MCNC: 3.5 G/DL (ref 2.8–4.4)
GLUCOSE BLD-MCNC: 105 MG/DL (ref 70–99)
HCT VFR BLD AUTO: 43.2 %
HGB BLD-MCNC: 14.8 G/DL
IMM GRANULOCYTES # BLD AUTO: 0.07 X10(3) UL (ref 0–1)
IMM GRANULOCYTES NFR BLD: 0.6 %
IRON SATN MFR SERPL: 23 %
IRON SERPL-MCNC: 85 UG/DL
LYMPHOCYTES # BLD AUTO: 1.85 X10(3) UL (ref 1–4)
LYMPHOCYTES NFR BLD AUTO: 14.8 %
MCH RBC QN AUTO: 33.9 PG (ref 26–34)
MCHC RBC AUTO-ENTMCNC: 34.3 G/DL (ref 31–37)
MCV RBC AUTO: 99.1 FL
MONOCYTES # BLD AUTO: 0.47 X10(3) UL (ref 0.1–1)
MONOCYTES NFR BLD AUTO: 3.8 %
NEUTROPHILS # BLD AUTO: 10 X10 (3) UL (ref 1.5–7.7)
NEUTROPHILS # BLD AUTO: 10 X10(3) UL (ref 1.5–7.7)
NEUTROPHILS NFR BLD AUTO: 79.7 %
OSMOLALITY SERPL CALC.SUM OF ELEC: 286 MOSM/KG (ref 275–295)
PLATELET # BLD AUTO: 437 10(3)UL (ref 150–450)
POTASSIUM SERPL-SCNC: 3.9 MMOL/L (ref 3.5–5.1)
PROT SERPL-MCNC: 7.3 G/DL (ref 6.4–8.2)
RBC # BLD AUTO: 4.36 X10(6)UL
SODIUM SERPL-SCNC: 138 MMOL/L (ref 136–145)
TIBC SERPL-MCNC: 362 UG/DL (ref 240–450)
TRANSFERRIN SERPL-MCNC: 243 MG/DL (ref 200–360)
WBC # BLD AUTO: 12.5 X10(3) UL (ref 4–11)

## 2023-03-03 PROCEDURE — 96413 CHEMO IV INFUSION 1 HR: CPT

## 2023-03-03 PROCEDURE — S0028 INJECTION, FAMOTIDINE, 20 MG: HCPCS | Performed by: INTERNAL MEDICINE

## 2023-03-03 PROCEDURE — 99215 OFFICE O/P EST HI 40 MIN: CPT | Performed by: INTERNAL MEDICINE

## 2023-03-03 PROCEDURE — 96375 TX/PRO/DX INJ NEW DRUG ADDON: CPT

## 2023-03-03 RX ORDER — FAMOTIDINE 10 MG/ML
20 INJECTION, SOLUTION INTRAVENOUS ONCE
Status: COMPLETED | OUTPATIENT
Start: 2023-03-03 | End: 2023-03-03

## 2023-03-03 RX ORDER — FAMOTIDINE 10 MG/ML
20 INJECTION, SOLUTION INTRAVENOUS ONCE
Status: CANCELLED
Start: 2023-03-03 | End: 2023-03-03

## 2023-03-03 RX ADMIN — FAMOTIDINE 20 MG: 10 INJECTION, SOLUTION INTRAVENOUS at 10:36:00

## 2023-03-03 NOTE — PROGRESS NOTES
Pt here for C 8 D 1 . Arrives Ambulating independently, accompanied by Self           Pregnancy screening: Not applicable    Modifications in dose or schedule: No    Drugs/infusions dual verified for appearance and physical integrity.  IV pump settings were dual verified: yes     Frequency of blood return and site check throughout administration: Prior to administration and At completion of therapy   Discharged to Home, Ambulating independently, accompanied by:Self    Outpatient Oncology Care Plan  Problem list:  fatigue  knowledge deficit  Problems related to:  chemotherapy  Interventions:  monitor effect of therapy  monitor lab values  promoted rest  Expected outcomes:  safe in environment  symptoms relieved/minimized  understands plan of care  verbalize how to care for self  Progress towards outcome:  unchanged    Education Record    Learner:  Patient  Barriers / Limitations:  None  Method:  Discussion  Outcome:  Shows understanding  Comments:

## 2023-03-03 NOTE — PROGRESS NOTES
received request to send documents from 02/10/23 and 23 treatments; Pepcid treatment clearly marked and refaxed to [address] F# [phone] as requested.    ( 10/20/1942, [address] [place] [id number], Claim # [id number], Policy No. [id number])

## 2023-03-20 ENCOUNTER — TELEPHONE (OUTPATIENT)
Dept: HEMATOLOGY/ONCOLOGY | Facility: HOSPITAL | Age: 73
End: 2023-03-20

## 2023-03-20 NOTE — TELEPHONE ENCOUNTER
Pt wanting to know what \"special\" blood test dose Dr. Kev Flores want drawn? Reviewed Dr. Hieu Barfield MD note from last visit- signatera mentioned. Pt informed. Pt questioning if signatera will be drawn at her visit this coming Friday, 3/24   Will check with Dr. Kev Flores and celso her appt note if needed.

## 2023-03-24 ENCOUNTER — OFFICE VISIT (OUTPATIENT)
Dept: HEMATOLOGY/ONCOLOGY | Facility: HOSPITAL | Age: 73
End: 2023-03-24
Attending: INTERNAL MEDICINE
Payer: MEDICARE

## 2023-03-24 VITALS
DIASTOLIC BLOOD PRESSURE: 92 MMHG | RESPIRATION RATE: 20 BRPM | WEIGHT: 139 LBS | TEMPERATURE: 98 F | OXYGEN SATURATION: 95 % | BODY MASS INDEX: 25.26 KG/M2 | HEART RATE: 89 BPM | HEIGHT: 62.01 IN | SYSTOLIC BLOOD PRESSURE: 171 MMHG

## 2023-03-24 VITALS — DIASTOLIC BLOOD PRESSURE: 91 MMHG | SYSTOLIC BLOOD PRESSURE: 161 MMHG

## 2023-03-24 DIAGNOSIS — Z85.820 PERSONAL HISTORY OF MALIGNANT MELANOMA OF SKIN: Primary | ICD-10-CM

## 2023-03-24 LAB
ALBUMIN SERPL-MCNC: 3.9 G/DL (ref 3.4–5)
ALBUMIN/GLOB SERPL: 1.2 {RATIO} (ref 1–2)
ALP LIVER SERPL-CCNC: 75 U/L
ALT SERPL-CCNC: 57 U/L
ANION GAP SERPL CALC-SCNC: 8 MMOL/L (ref 0–18)
AST SERPL-CCNC: 36 U/L (ref 15–37)
BASOPHILS # BLD AUTO: 0.09 X10(3) UL (ref 0–0.2)
BASOPHILS NFR BLD AUTO: 0.7 %
BILIRUB SERPL-MCNC: 0.3 MG/DL (ref 0.1–2)
BUN BLD-MCNC: 15 MG/DL (ref 7–18)
CALCIUM BLD-MCNC: 9.5 MG/DL (ref 8.5–10.1)
CHLORIDE SERPL-SCNC: 109 MMOL/L (ref 98–112)
CO2 SERPL-SCNC: 24 MMOL/L (ref 21–32)
CREAT BLD-MCNC: 0.9 MG/DL
EOSINOPHIL # BLD AUTO: 0.11 X10(3) UL (ref 0–0.7)
EOSINOPHIL NFR BLD AUTO: 0.9 %
ERYTHROCYTE [DISTWIDTH] IN BLOOD BY AUTOMATED COUNT: 12.9 %
GFR SERPLBLD BASED ON 1.73 SQ M-ARVRAT: 67 ML/MIN/1.73M2 (ref 60–?)
GLOBULIN PLAS-MCNC: 3.3 G/DL (ref 2.8–4.4)
GLUCOSE BLD-MCNC: 101 MG/DL (ref 70–99)
HCT VFR BLD AUTO: 43.7 %
HGB BLD-MCNC: 14.9 G/DL
IMM GRANULOCYTES # BLD AUTO: 0.2 X10(3) UL (ref 0–1)
IMM GRANULOCYTES NFR BLD: 1.6 %
LYMPHOCYTES # BLD AUTO: 2.95 X10(3) UL (ref 1–4)
LYMPHOCYTES NFR BLD AUTO: 23.6 %
MCH RBC QN AUTO: 34.1 PG (ref 26–34)
MCHC RBC AUTO-ENTMCNC: 34.1 G/DL (ref 31–37)
MCV RBC AUTO: 100 FL
MONOCYTES # BLD AUTO: 0.66 X10(3) UL (ref 0.1–1)
MONOCYTES NFR BLD AUTO: 5.3 %
NEUTROPHILS # BLD AUTO: 8.48 X10 (3) UL (ref 1.5–7.7)
NEUTROPHILS # BLD AUTO: 8.48 X10(3) UL (ref 1.5–7.7)
NEUTROPHILS NFR BLD AUTO: 67.9 %
OSMOLALITY SERPL CALC.SUM OF ELEC: 293 MOSM/KG (ref 275–295)
PLATELET # BLD AUTO: 413 10(3)UL (ref 150–450)
POTASSIUM SERPL-SCNC: 3.3 MMOL/L (ref 3.5–5.1)
PROT SERPL-MCNC: 7.2 G/DL (ref 6.4–8.2)
RBC # BLD AUTO: 4.37 X10(6)UL
SODIUM SERPL-SCNC: 141 MMOL/L (ref 136–145)
T4 FREE SERPL-MCNC: 0.8 NG/DL (ref 0.8–1.7)
TSI SER-ACNC: 3.75 MIU/ML (ref 0.36–3.74)
WBC # BLD AUTO: 12.5 X10(3) UL (ref 4–11)

## 2023-03-24 PROCEDURE — 99215 OFFICE O/P EST HI 40 MIN: CPT | Performed by: INTERNAL MEDICINE

## 2023-03-24 PROCEDURE — 96413 CHEMO IV INFUSION 1 HR: CPT

## 2023-03-24 PROCEDURE — 96375 TX/PRO/DX INJ NEW DRUG ADDON: CPT

## 2023-03-24 PROCEDURE — S0028 INJECTION, FAMOTIDINE, 20 MG: HCPCS | Performed by: INTERNAL MEDICINE

## 2023-03-24 RX ORDER — FAMOTIDINE 10 MG/ML
20 INJECTION, SOLUTION INTRAVENOUS ONCE
Status: COMPLETED | OUTPATIENT
Start: 2023-03-24 | End: 2023-03-24

## 2023-03-24 RX ORDER — FAMOTIDINE 10 MG/ML
20 INJECTION, SOLUTION INTRAVENOUS ONCE
Status: CANCELLED
Start: 2023-03-24 | End: 2023-03-24

## 2023-03-24 RX ADMIN — FAMOTIDINE 20 MG: 10 INJECTION, SOLUTION INTRAVENOUS at 10:25:00

## 2023-03-24 NOTE — PROGRESS NOTES
Pt here for MD f/u visit and [address] [facility]. Overall patient feeling about the same. Continues on Prednisone daily. Had been alternating 5mg and 10 mg but noted rash was more bothersome. New SOB since last treatment, more so with activity. Still able to do ADLs.

## 2023-03-24 NOTE — PROGRESS NOTES
Pt here for [address]. Arrives Ambulating independently, accompanied by Self           Pregnancy screening: Denies possibility of pregnancy    Modifications in dose or schedule: No    Drugs/infusions dual verified for appearance and physical integrity. IV pump settings were dual verified: yes     Frequency of blood return and site check throughout administration: Prior to administration   Discharged to Home, Ambulating independently, accompanied by:Self    Outpatient Oncology Care Plan  Problem list:  fatigue  Problems related to:  chemotherapy  disease/disease progression  side effect of treatment  Interventions:  maintain safe environment  encourage activity as tolerated  promoted rest  provided general teaching  Expected outcomes:  adequate sleep/rest  safe in environment  symptoms relieved/minimized  understands plan of care  Progress towards outcome:  making progress    Education Record    Learner:  Patient  Barriers / Limitations:  None  Method:  Brief focused and Reinforcement  Outcome:  Shows understanding  Comments: Patient tolerated chemotherapy and discharged in stable condition.

## 2023-03-29 ENCOUNTER — HOSPITAL ENCOUNTER (OUTPATIENT)
Dept: NUCLEAR MEDICINE | Facility: HOSPITAL | Age: 73
Discharge: HOME | End: 2023-03-29
Attending: INTERNAL MEDICINE
Payer: MEDICARE

## 2023-03-29 DIAGNOSIS — Z85.820 PERSONAL HISTORY OF MALIGNANT MELANOMA OF SKIN: ICD-10-CM

## 2023-03-29 LAB — GLUCOSE BLD-MCNC: 107 MG/DL (ref 70–99)

## 2023-03-29 PROCEDURE — 78816 PET IMAGE W/CT FULL BODY: CPT | Performed by: INTERNAL MEDICINE

## 2023-03-29 PROCEDURE — 82962 GLUCOSE BLOOD TEST: CPT

## 2023-04-14 ENCOUNTER — SOCIAL WORK SERVICES (OUTPATIENT)
Dept: HEMATOLOGY/ONCOLOGY | Facility: HOSPITAL | Age: 73
End: 2023-04-14

## 2023-04-14 ENCOUNTER — OFFICE VISIT (OUTPATIENT)
Dept: HEMATOLOGY/ONCOLOGY | Facility: HOSPITAL | Age: 73
End: 2023-04-14
Attending: INTERNAL MEDICINE
Payer: MEDICARE

## 2023-04-14 VITALS
WEIGHT: 139.5 LBS | HEIGHT: 62.01 IN | HEART RATE: 89 BPM | SYSTOLIC BLOOD PRESSURE: 142 MMHG | RESPIRATION RATE: 20 BRPM | TEMPERATURE: 98 F | OXYGEN SATURATION: 96 % | DIASTOLIC BLOOD PRESSURE: 85 MMHG | BODY MASS INDEX: 25.35 KG/M2

## 2023-04-14 DIAGNOSIS — Z85.820 PERSONAL HISTORY OF MALIGNANT MELANOMA OF SKIN: Primary | ICD-10-CM

## 2023-04-14 DIAGNOSIS — Z85.820 PERSONAL HISTORY OF MALIGNANT MELANOMA OF SKIN: ICD-10-CM

## 2023-04-14 LAB
ALBUMIN SERPL-MCNC: 4.1 G/DL (ref 3.4–5)
ALBUMIN/GLOB SERPL: 1.2 {RATIO} (ref 1–2)
ALP LIVER SERPL-CCNC: 74 U/L
ALT SERPL-CCNC: 58 U/L
ANION GAP SERPL CALC-SCNC: 5 MMOL/L (ref 0–18)
AST SERPL-CCNC: 36 U/L (ref 15–37)
BASOPHILS # BLD AUTO: 0.06 X10(3) UL (ref 0–0.2)
BASOPHILS NFR BLD AUTO: 0.4 %
BILIRUB SERPL-MCNC: 0.5 MG/DL (ref 0.1–2)
BUN BLD-MCNC: 15 MG/DL (ref 7–18)
CALCIUM BLD-MCNC: 9.5 MG/DL (ref 8.5–10.1)
CHLORIDE SERPL-SCNC: 109 MMOL/L (ref 98–112)
CO2 SERPL-SCNC: 24 MMOL/L (ref 21–32)
CREAT BLD-MCNC: 0.96 MG/DL
EOSINOPHIL # BLD AUTO: 0.09 X10(3) UL (ref 0–0.7)
EOSINOPHIL NFR BLD AUTO: 0.6 %
ERYTHROCYTE [DISTWIDTH] IN BLOOD BY AUTOMATED COUNT: 13.2 %
GFR SERPLBLD BASED ON 1.73 SQ M-ARVRAT: 62 ML/MIN/1.73M2 (ref 60–?)
GLOBULIN PLAS-MCNC: 3.3 G/DL (ref 2.8–4.4)
GLUCOSE BLD-MCNC: 104 MG/DL (ref 70–99)
HCT VFR BLD AUTO: 43.1 %
HGB BLD-MCNC: 14.8 G/DL
IMM GRANULOCYTES # BLD AUTO: 0.09 X10(3) UL (ref 0–1)
IMM GRANULOCYTES NFR BLD: 0.6 %
LYMPHOCYTES # BLD AUTO: 2.69 X10(3) UL (ref 1–4)
LYMPHOCYTES NFR BLD AUTO: 16.8 %
MCH RBC QN AUTO: 34.7 PG (ref 26–34)
MCHC RBC AUTO-ENTMCNC: 34.3 G/DL (ref 31–37)
MCV RBC AUTO: 101.2 FL
MONOCYTES # BLD AUTO: 0.85 X10(3) UL (ref 0.1–1)
MONOCYTES NFR BLD AUTO: 5.3 %
NEUTROPHILS # BLD AUTO: 12.21 X10 (3) UL (ref 1.5–7.7)
NEUTROPHILS # BLD AUTO: 12.21 X10(3) UL (ref 1.5–7.7)
NEUTROPHILS NFR BLD AUTO: 76.3 %
OSMOLALITY SERPL CALC.SUM OF ELEC: 287 MOSM/KG (ref 275–295)
PLATELET # BLD AUTO: 399 10(3)UL (ref 150–450)
POTASSIUM SERPL-SCNC: 3.6 MMOL/L (ref 3.5–5.1)
PROT SERPL-MCNC: 7.4 G/DL (ref 6.4–8.2)
RBC # BLD AUTO: 4.26 X10(6)UL
SODIUM SERPL-SCNC: 138 MMOL/L (ref 136–145)
WBC # BLD AUTO: 16 X10(3) UL (ref 4–11)

## 2023-04-14 PROCEDURE — 99215 OFFICE O/P EST HI 40 MIN: CPT | Performed by: INTERNAL MEDICINE

## 2023-04-14 PROCEDURE — 96413 CHEMO IV INFUSION 1 HR: CPT

## 2023-04-14 PROCEDURE — S0028 INJECTION, FAMOTIDINE, 20 MG: HCPCS | Performed by: INTERNAL MEDICINE

## 2023-04-14 PROCEDURE — 96375 TX/PRO/DX INJ NEW DRUG ADDON: CPT

## 2023-04-14 RX ORDER — FAMOTIDINE 10 MG/ML
20 INJECTION, SOLUTION INTRAVENOUS ONCE
Status: CANCELLED
Start: 2023-04-14 | End: 2023-04-14

## 2023-04-14 RX ORDER — FAMOTIDINE 10 MG/ML
20 INJECTION, SOLUTION INTRAVENOUS ONCE
Status: COMPLETED | OUTPATIENT
Start: 2023-04-14 | End: 2023-04-14

## 2023-04-14 RX ADMIN — FAMOTIDINE 20 MG: 10 INJECTION, SOLUTION INTRAVENOUS at 10:29:00

## 2023-04-14 NOTE — PROGRESS NOTES
received request to send documents from 23 and 23 treatments; Pepcid treatment clearly marked and faxed to [address] [place], [place] [address] F# [phone] as requested.    ( 10/20/1942, [address] [place] [address], [address] [place] [address], Claim # [id number], Policy No. [id number])

## 2023-04-14 NOTE — PROGRESS NOTES
Pt here for C10,D1 Keytruda. Arrives Ambulating independently, accompanied by Self       Oral medications included in this regimen:  no    Patient confirms comprehension of cancer treatment schedule:  yes    Pregnancy screening:  Not applicable    Modifications in dose or schedule:  No    Medications appearance and physical integrity checked by RN. Chemotherapy IV pump settings verified by 2 RNs:  yes     Frequency of blood return and site check throughout administration: Prior to administration     Infusion/treatment outcome:  patient tolerated treatment without incident    Education Record    Learner:  Patient  Barriers / Limitations:  None  Method:  Brief focused  Education / instructions given:  Plan of care  Outcome:  Shows understanding    Discharged Home, Ambulating independently, accompanied by:Self    Patient/family verbalized understanding of future appointments: by printed AVS      Per MD, patient to schedule PET scan and labs (signatera) 3 weeks prior to MD visit 7/14. Order for scan and number to call provided to patient; verbalized understanding.

## 2023-04-17 ENCOUNTER — SOCIAL WORK SERVICES (OUTPATIENT)
Dept: HEMATOLOGY/ONCOLOGY | Facility: HOSPITAL | Age: 73
End: 2023-04-17

## 2023-04-17 NOTE — PROGRESS NOTES
wrote letter and sent Imaging documentation to [facility] F# [phone] per patient request.  ( 10/20/1942, [address] [place] [address], [address] [place] [address], Claim # [id number], Policy No. [id number])  Kolton Clancy ( 10/20/1950) is a patient under my care at [facility] for her diagnosis of Metastatic Melanoma. She has undergone MRIs and PET Scans for initial diagnosis and follow-up evaluation purposes. These tests are covered once per year by her Insurance. Please note the documentation attached. Year : PET Scan 2022, Brain MRI 2022, PET Scan 12/15/2022.   Year : PET Scan 2023

## 2023-05-03 ENCOUNTER — SOCIAL WORK SERVICES (OUTPATIENT)
Dept: HEMATOLOGY/ONCOLOGY | Facility: HOSPITAL | Age: 73
End: 2023-05-03

## 2023-05-03 NOTE — PROGRESS NOTES
Patient states that [employer] did not receive fax with Letter/Imaging;  refaxed information with confirmation page from 4/17/23 to [address] F# [phone] per patient request

## 2023-05-17 ENCOUNTER — SOCIAL WORK SERVICES (OUTPATIENT)
Dept: HEMATOLOGY/ONCOLOGY | Facility: HOSPITAL | Age: 73
End: 2023-05-17

## 2023-05-17 ENCOUNTER — TELEPHONE (OUTPATIENT)
Dept: HEMATOLOGY/ONCOLOGY | Facility: HOSPITAL | Age: 73
End: 2023-05-17

## 2023-05-17 NOTE — PROGRESS NOTES
Patient requested documentation from past appointments be sent with letter explaining distance traveled to [address] [place] [address] for her claim. Faxed with letter to [address] [place] [address] F# [phone]    Letter:   Madison Metz ( 10/20/1950) is a patient under my care at [facility] for her diagnosis of Metastatic Melanoma. Attached, you will find documentation of the following dates of Appointments, Testing, and Treatments.  Her distance traveled is 106miles roundtrip for each of the following appointments:     , 2022 8, 2022 16, 2022 30, 2022  Oct. 6 , 2022  Oct. 27, 2022 17, 2022  Dec. 8, 2022  Dec. 15, 2022  Dec. 29, 2022, 2023  Feb. 10, 2023  March 3, 2023  2023  2023  2023

## 2023-05-17 NOTE — TELEPHONE ENCOUNTER
Pt is calling to ask about a test that Dr. Sang Bruno spoke to her about. She Tawannabeverly Vitaleyer it was called [facility]. She would like a call back to discuss and to see if it can be orderd.  Please advise-NL

## 2023-06-20 ENCOUNTER — HOSPITAL ENCOUNTER (OUTPATIENT)
Dept: NUCLEAR MEDICINE | Facility: HOSPITAL | Age: 73
Discharge: HOME | End: 2023-06-20
Attending: INTERNAL MEDICINE
Payer: MEDICARE

## 2023-06-20 ENCOUNTER — NURSE ONLY (OUTPATIENT)
Dept: HEMATOLOGY/ONCOLOGY | Facility: HOSPITAL | Age: 73
End: 2023-06-20
Attending: INTERNAL MEDICINE
Payer: MEDICARE

## 2023-06-20 ENCOUNTER — NURSE NAVIGATOR ENCOUNTER (OUTPATIENT)
Dept: HEMATOLOGY/ONCOLOGY | Facility: HOSPITAL | Age: 73
End: 2023-06-20

## 2023-06-20 DIAGNOSIS — Z85.820 PERSONAL HISTORY OF MALIGNANT MELANOMA OF SKIN: ICD-10-CM

## 2023-06-20 LAB — GLUCOSE BLD-MCNC: 105 MG/DL (ref 70–99)

## 2023-06-20 PROCEDURE — 36415 COLL VENOUS BLD VENIPUNCTURE: CPT

## 2023-06-20 PROCEDURE — 82962 GLUCOSE BLOOD TEST: CPT

## 2023-06-20 PROCEDURE — 78816 PET IMAGE W/CT FULL BODY: CPT | Performed by: INTERNAL MEDICINE

## 2023-06-27 ENCOUNTER — TELEPHONE (OUTPATIENT)
Dept: HEMATOLOGY/ONCOLOGY | Facility: HOSPITAL | Age: 73
End: 2023-06-27

## 2023-07-05 ENCOUNTER — SOCIAL WORK SERVICES (OUTPATIENT)
Dept: HEMATOLOGY/ONCOLOGY | Facility: HOSPITAL | Age: 73
End: 2023-07-05

## 2023-07-05 NOTE — PROGRESS NOTES
received request to send documents from 23 Scan and Lab Tests; faxed to [address] [place] [address] F# [phone] as requested.    ( 10/20/1942, [facility] [address], Claim # [id number], Policy No. [id number])

## 2023-07-14 ENCOUNTER — APPOINTMENT (OUTPATIENT)
Dept: HEMATOLOGY/ONCOLOGY | Facility: HOSPITAL | Age: 73
End: 2023-07-14
Attending: INTERNAL MEDICINE
Payer: MEDICARE

## 2023-09-21 ENCOUNTER — NURSE NAVIGATOR ENCOUNTER (OUTPATIENT)
Dept: HEMATOLOGY/ONCOLOGY | Facility: HOSPITAL | Age: 73
End: 2023-09-21

## 2023-09-21 ENCOUNTER — OFFICE VISIT (OUTPATIENT)
Dept: HEMATOLOGY/ONCOLOGY | Facility: HOSPITAL | Age: 73
End: 2023-09-21
Attending: INTERNAL MEDICINE
Payer: MEDICARE

## 2023-09-21 VITALS
OXYGEN SATURATION: 96 % | BODY MASS INDEX: 25.92 KG/M2 | SYSTOLIC BLOOD PRESSURE: 152 MMHG | TEMPERATURE: 99 F | DIASTOLIC BLOOD PRESSURE: 93 MMHG | HEIGHT: 62.01 IN | RESPIRATION RATE: 16 BRPM | HEART RATE: 95 BPM | WEIGHT: 142.63 LBS

## 2023-09-21 DIAGNOSIS — Z85.820 PERSONAL HISTORY OF MALIGNANT MELANOMA OF SKIN: Primary | ICD-10-CM

## 2023-09-21 DIAGNOSIS — D75.9 DISEASE OF BLOOD AND BLOOD-FORMING ORGANS, UNSPECIFIED: ICD-10-CM

## 2023-09-21 DIAGNOSIS — R94.6 ABNORMAL RESULTS OF THYROID FUNCTION STUDIES: ICD-10-CM

## 2023-09-21 DIAGNOSIS — R76.8 POSITIVE ANA (ANTINUCLEAR ANTIBODY): ICD-10-CM

## 2023-09-21 LAB
ALBUMIN SERPL-MCNC: 3.8 G/DL (ref 3.4–5)
ALBUMIN/GLOB SERPL: 1.1 {RATIO} (ref 1–2)
ALP LIVER SERPL-CCNC: 88 U/L
ALT SERPL-CCNC: 49 U/L
ANION GAP SERPL CALC-SCNC: 6 MMOL/L (ref 0–18)
AST SERPL-CCNC: 33 U/L (ref 15–37)
BASOPHILS # BLD AUTO: 0.06 X10(3) UL (ref 0–0.2)
BASOPHILS NFR BLD AUTO: 0.7 %
BILIRUB SERPL-MCNC: 0.4 MG/DL (ref 0.1–2)
BUN BLD-MCNC: 7 MG/DL (ref 7–18)
CALCIUM BLD-MCNC: 9.4 MG/DL (ref 8.5–10.1)
CHLORIDE SERPL-SCNC: 104 MMOL/L (ref 98–112)
CO2 SERPL-SCNC: 26 MMOL/L (ref 21–32)
CREAT BLD-MCNC: 0.7 MG/DL
EGFRCR SERPLBLD CKD-EPI 2021: 91 ML/MIN/1.73M2 (ref 60–?)
EOSINOPHIL # BLD AUTO: 0.16 X10(3) UL (ref 0–0.7)
EOSINOPHIL NFR BLD AUTO: 1.9 %
ERYTHROCYTE [DISTWIDTH] IN BLOOD BY AUTOMATED COUNT: 12.2 %
FASTING STATUS PATIENT QL REPORTED: NO
GLOBULIN PLAS-MCNC: 3.5 G/DL (ref 2.8–4.4)
GLUCOSE BLD-MCNC: 112 MG/DL (ref 70–99)
HCT VFR BLD AUTO: 43.3 %
HGB BLD-MCNC: 15.2 G/DL
IMM GRANULOCYTES # BLD AUTO: 0.02 X10(3) UL (ref 0–1)
IMM GRANULOCYTES NFR BLD: 0.2 %
LYMPHOCYTES # BLD AUTO: 2.45 X10(3) UL (ref 1–4)
LYMPHOCYTES NFR BLD AUTO: 28.7 %
MCH RBC QN AUTO: 33.2 PG (ref 26–34)
MCHC RBC AUTO-ENTMCNC: 35.1 G/DL (ref 31–37)
MCV RBC AUTO: 94.5 FL
MONOCYTES # BLD AUTO: 0.64 X10(3) UL (ref 0.1–1)
MONOCYTES NFR BLD AUTO: 7.5 %
NEUTROPHILS # BLD AUTO: 5.21 X10 (3) UL (ref 1.5–7.7)
NEUTROPHILS # BLD AUTO: 5.21 X10(3) UL (ref 1.5–7.7)
NEUTROPHILS NFR BLD AUTO: 61 %
OSMOLALITY SERPL CALC.SUM OF ELEC: 281 MOSM/KG (ref 275–295)
PLATELET # BLD AUTO: 460 10(3)UL (ref 150–450)
POTASSIUM SERPL-SCNC: 3.9 MMOL/L (ref 3.5–5.1)
PROT SERPL-MCNC: 7.3 G/DL (ref 6.4–8.2)
RBC # BLD AUTO: 4.58 X10(6)UL
SODIUM SERPL-SCNC: 136 MMOL/L (ref 136–145)
TSI SER-ACNC: 2.48 MIU/ML (ref 0.36–3.74)
WBC # BLD AUTO: 8.5 X10(3) UL (ref 4–11)

## 2023-09-21 PROCEDURE — 99215 OFFICE O/P EST HI 40 MIN: CPT | Performed by: INTERNAL MEDICINE

## 2023-09-21 NOTE — PROGRESS NOTES
Outpatient Oncology Care Plan   Problem list:   fatigue   Problems related to:   self care   Interventions:   provided general teaching   Expected outcomes:   understands plan of care   Progress towards outcome: making progress     Education Record   Learner: Patient   Barriers / Limitations: None   Method: Discussion   Outcome: Shows understanding   Comments:  Patient here for follow-up. States energy levels are poor. Still having a rash and skin itching. Stopped prednisone and zytrec on 8/1/23. Having more noticeable bruising, hair loss, and dyspnea. States she has more noticeable weight gain.

## 2023-09-25 ENCOUNTER — SOCIAL WORK SERVICES (OUTPATIENT)
Dept: HEMATOLOGY/ONCOLOGY | Facility: HOSPITAL | Age: 73
End: 2023-09-25

## 2023-09-25 NOTE — PROGRESS NOTES
received request to send documents from 23, with mileage; faxed to [address] [place] [address] F# [phone] as requested.    ( 10/20/1942, [facility] [address] [address], Claim # [id number], Policy No. [id number])

## 2023-10-19 ENCOUNTER — HOSPITAL ENCOUNTER (OUTPATIENT)
Dept: ULTRASOUND IMAGING | Age: 73
Discharge: HOME | End: 2023-10-19
Attending: INTERNAL MEDICINE
Payer: MEDICARE

## 2023-10-19 DIAGNOSIS — D75.9 DISEASE OF BLOOD AND BLOOD-FORMING ORGANS, UNSPECIFIED: ICD-10-CM

## 2023-10-19 DIAGNOSIS — Z85.820 PERSONAL HISTORY OF MALIGNANT MELANOMA OF SKIN: ICD-10-CM

## 2023-10-19 PROCEDURE — 76536 US EXAM OF HEAD AND NECK: CPT | Performed by: INTERNAL MEDICINE

## 2023-10-20 ENCOUNTER — HOSPITAL ENCOUNTER (OUTPATIENT)
Dept: NUCLEAR MEDICINE | Facility: HOSPITAL | Age: 73
Discharge: HOME | End: 2023-10-20
Attending: INTERNAL MEDICINE
Payer: MEDICARE

## 2023-10-20 DIAGNOSIS — D75.9 DISEASE OF BLOOD AND BLOOD-FORMING ORGANS, UNSPECIFIED: ICD-10-CM

## 2023-10-20 DIAGNOSIS — Z85.820 PERSONAL HISTORY OF MALIGNANT MELANOMA OF SKIN: ICD-10-CM

## 2023-10-20 LAB — GLUCOSE BLD-MCNC: 96 MG/DL (ref 70–99)

## 2023-10-20 PROCEDURE — 78816 PET IMAGE W/CT FULL BODY: CPT | Performed by: INTERNAL MEDICINE

## 2023-10-20 PROCEDURE — 82962 GLUCOSE BLOOD TEST: CPT

## 2023-10-24 ENCOUNTER — SOCIAL WORK SERVICES (OUTPATIENT)
Dept: HEMATOLOGY/ONCOLOGY | Facility: HOSPITAL | Age: 73
End: 2023-10-24

## 2023-10-24 NOTE — PROGRESS NOTES
received request to send mileage maps for 2 different trips (10/19/23 and 10/20/23); faxed to [address] [place], [place] [address]edilson F# [phone] as requested.    ( 10/20/1942, [facility], [address] [place] [address], Claim # [id number], Policy No. [id number])

## 2023-12-28 DIAGNOSIS — Z85.820 PERSONAL HISTORY OF MALIGNANT MELANOMA OF SKIN: ICD-10-CM

## 2023-12-28 RX ORDER — PREDNISONE 5 MG/1
10 TABLET ORAL DAILY
Qty: 180 TABLET | Refills: 3 | OUTPATIENT
Start: 2023-12-28

## 2024-01-10 ENCOUNTER — SOCIAL WORK SERVICES (OUTPATIENT)
Dept: HEMATOLOGY/ONCOLOGY | Facility: HOSPITAL | Age: 74
End: 2024-01-10

## 2024-01-10 NOTE — PROGRESS NOTES
SW received a call from pt requesting a call for medical records to be fax to her new Rheumatologist.  SW faxed MD notes, labs, PET scan, facesheet and medication list to Dr. Odalys Wilson fax [phone].    [place] [address]  Ph: [phone] [email or web address]  Fax: [phone]

## 2024-03-21 ENCOUNTER — TELEPHONE (OUTPATIENT)
Dept: HEMATOLOGY/ONCOLOGY | Facility: HOSPITAL | Age: 74
End: 2024-03-21

## 2024-03-21 NOTE — TELEPHONE ENCOUNTER
Patient cancelled her 3/28/24 follow up.  She wanted to speak to Sue to see if she needs a follow up or not.  Please call back.

## 2024-03-28 ENCOUNTER — APPOINTMENT (OUTPATIENT)
Dept: HEMATOLOGY/ONCOLOGY | Facility: HOSPITAL | Age: 74
End: 2024-03-28
Attending: INTERNAL MEDICINE
Payer: MEDICARE

## 2024-04-02 NOTE — PROGRESS NOTES
[facility] Note    Diagnosis: Metastatic Melanoma, BRAF WT    Treatment History:   1. Re-excision of subcutaneous metastatic melanoma nodule    2. Keytruda: 10/6/22-4/14/23: stopped early due to rash     Visit Diagnosis:  1. Personal history of malignant melanoma of skin    2. Encounter for routine cancer follow-up      History of Present Illness: 72F with a PMH of GERD and melanoma in situ referred by Dr. Cobb for malignant melanoma.     She has a history of melanoma in situ from 2014 that was excised. She had a subcutaneous nodule in June 2022 that was proven to be metastatic melanoma. This was re-excised on 9/16/22. Her case was discussed at tumor board and consideration of immunotherapy was given. She was Keytruda from 10/2022-04/2023 (10 cycles). She stopped early due to rash.     Hematology/Oncology History:   -10/2014: Melanoma in situ removed from her back by Dr. Hernández. She was undergoing annual skin checks.     -She noted a subcutaneous nodule around June 2022. This was thought to be a melanoma which was resected and noted to be metastatic melanoma. There were positive margins. Of note, this lesion was adjacent to her previous melanoma.     -PET/CT on 8/11/2022: There is no evidence of metabolically active metastatic disease.    -Reexcision of upper back metastatic malignant melanoma on 8/29/2022 with Dr. Cobb. Pathology showed residual metastatic melanoma involving the dermis and subcutis. She discussed pathology with Dr. Cobb and there are plans for re-excision on 9/16/22. Re-excision on 9/16/22 showed a residual foci of metastatic melanoma, tumor < 1 mm from the margin.     -Keytruda: 10/6/22-Current  C1: 10/6/22  C2: 10/27/22  C3: 11/17/22  C4: 12/8/22  PET/CT on 12/15/22 with No evidence of disease   C5: 12/29/22  C6: 1/19/23  C7: 2/9/23  C8: 3/3/23  C9: 3/24/23. Signatera was negative   PET/CT on 3/29/23: No evidence of disease   C10: 4/14/23  --Treatment  stopped early due to rash---    -PET/CT and Signatera on 6/20/23: TRICIA    -Signatera 09/21/2023: negative     -PET/CT on 10/20/23: TRICIA    Interval History  -Signatera today  -Following with orthopedics for hip pain  -Occasional chest pain every 3 weeks. Occurs when she is in bed. Does not occur with walking. She will see cardiology.   -She is going to see dermatology-they are planning on a skin biopsy. After she stopped prednisone due to hip issues, her rash returned. Mild rash on face. She did not take hydroxyzine   -Met with rheumatology. Does not think she has SLE. DONAL was negative   -She has not seen a pulmonologist     Review of Systems: 12 Point ROS was completed and pertinent positives are in the HPI    Current Outpatient Medications on File Prior to Visit   Medication Sig Dispense Refill    metoprolol succinate ER 50 MG Oral Tablet 24 Hr Take 1 tablet (50 mg total) by mouth daily.      alendronate 70 MG Oral Tab once a week.      aspirin 81 MG Oral Tab EC aspirin 81 mg tablet,delayed release   TAKE 1 TABLET BY MOUTH EVERY DAY      Meloxicam 7.5 MG Oral Tab daily.      atorvastatin 40 MG Oral Tab Take 1 tablet (40 mg total) by mouth daily.      pantoprazole 40 MG Oral Tab EC Take 1 tablet (40 mg total) by mouth every morning before breakfast.      Multiple Vitamin (MULTI-VITAMIN DAILY) Oral Tab Take  by mouth.      Cholecalciferol (VITAMIN D) 1000 UNITS Oral Cap Take  by mouth.      cetirizine 10 MG Oral Tab Take 1 tablet (10 mg total) by mouth every 12 (twelve) hours as needed for Allergies. (Patient not taking: Reported on 9/21/2023) 60 tablet 0    fluticasone propionate 50 MCG/ACT Nasal Suspension as needed. (Patient not taking: Reported on 4/14/2023)       No current facility-administered medications on file prior to visit.     Past Medical History:   Diagnosis Date    Cancer (HCC)     melanoma 8 years ago    COVID-19 04/2022    cold-like symptoms, cough and runny nose    Visual impairment     reading  glasses     Past Surgical History:   Procedure Laterality Date    CARPAL TUNNEL RELEASE Bilateral 2005    CATARACT      CHOLECYSTECTOMY      COLONOSCOPY      OTHER      ruptured cyst on ovary    OTHER  2014    melanoma removal    SKIN SURGERY  10-2-14    excision/ MMIS/ right upper medial back/     UPPER GI ENDOSCOPY,EXAM       Social History     Socioeconomic History    Marital status:    Tobacco Use    Smoking status: Former    Smokeless tobacco: Never    Tobacco comments:     quit 2012   Vaping Use    Vaping Use: Never used   Substance and Sexual Activity    Alcohol use: Yes     Alcohol/week: 14.0 - 21.0 standard drinks of alcohol     Types: 7 - 14 Glasses of wine, 7 Cans of beer per week    Drug use: Never      Family History   Problem Relation Age of Onset    Cancer Mother         skin       Physical Exam  Height: 157.5 cm (5' 2.01\") (04/04 1252)  Weight: 64 kg (141 lb 3.2 oz) (04/04 1252)  BSA (Calculated - sq m): 1.65 sq meters (04/04 1252)  Pulse: 96 (04/04 1252)  BP: 150/102 (04/04 1252)  Temp: 97.5 °F (36.4 °C) (04/04 1252)  Do Not Use - Resp Rate: --  SpO2: 95 % (04/04 1252)     General: NAD, AOX3  HEENT: clear op, mmm, no jvd, no scleral icterus, small palpable soft tissue mass on neck  CV: RR  Extremities: No edema   Lungs:  no increased work of breathing  Abd: non-distended   Neuro: CN: II-XII grossly intact  Skin: mild maculopapular rash on face, arms    Results:  Lab Results   Component Value Date    WBC 8.7 04/04/2024    HGB 14.1 04/04/2024    HCT 39.7 04/04/2024    MCV 95.4 04/04/2024    .0 04/04/2024     Lab Results   Component Value Date     09/21/2023    K 3.9 09/21/2023    CO2 26.0 09/21/2023     09/21/2023    BUN 7 09/21/2023    ALB 3.8 09/21/2023       No results found for: \"LDH\"    Radiology:  Reviewed     Pathology:   Final Diagnosis:   Skin, right upper back, excision:  -Residual metastatic melanoma involving the dermis and subcutis.  -See comment.       Electronically signed by Anna Lopez MD on 9/7/2022 at 1348      Final Diagnosis Comment     There are multiple foci of tumor scattered in the dermis and subcutis, measuring up to 4 mm in greatest dimension.  The tumor has a similar morphology to that seen in a recent excision ([facility]-; 7/19/2022), which was reviewed.     The findings are consistent with residual metastatic melanoma.  The tumor involves the 6:00-9:00 peripheral dermal margin and is at less than 1 mm from the deep margin towards the 6:00 tip.     Assessment and Plan:  Metastatic Melanoma, BRAF WT (low burden disease)   -We discussed that this since her lesion appears to be a metastatic deposit, I recommend that she proceed with immunotherapy for 1-2 years pending tolerability. She has developed a progressively worse rash. She was only able to complete 10 cycles of keytruda (last 04/2023). She is now on observation with q3-6 month PET and q3 month signatera, CBC, CMP. Signatera ordered today. PET ordered.     Grade 1-2 Rash: occurred 1 day after Keytruda.   -She stopped zyrtec and prednisone around Aug 1. Only mild improvement with low dose steroids. Do not recommend high dose steroids given grade 1-2 and her possible TIM. Of note, her LALY check in June 2023 by alicia PCP was markedly elevated-this may explain her rash and why she reacted so quickly after immunotherapy.     L hip pain: Possible mild AVN : follow up with orthopedics    Positive LALY: Met with Rheumatology. DONAL was negative. Unclear if this is playing a role in her compaints.     Reported Mass on Chest wall: no identifiable lesion. Pet negative. Thyroid US unremarkable aside from subcutaneous fat. Can consider CT neck if bothersome    HTN: follow up with PCP    Chest Pain/Dyspnea: Non exertional. Occurs in bed every 3 weeks. Follow up with cardiology and pulmonary as planned    RTC in 3 months     [facility]

## 2024-04-04 ENCOUNTER — OFFICE VISIT (OUTPATIENT)
Dept: HEMATOLOGY/ONCOLOGY | Facility: HOSPITAL | Age: 74
End: 2024-04-04
Attending: INTERNAL MEDICINE
Payer: MEDICARE

## 2024-04-04 VITALS
HEIGHT: 62.01 IN | WEIGHT: 141.19 LBS | BODY MASS INDEX: 25.65 KG/M2 | RESPIRATION RATE: 22 BRPM | OXYGEN SATURATION: 95 % | TEMPERATURE: 98 F | SYSTOLIC BLOOD PRESSURE: 147 MMHG | HEART RATE: 96 BPM | DIASTOLIC BLOOD PRESSURE: 84 MMHG

## 2024-04-04 DIAGNOSIS — Z08 ENCOUNTER FOR ROUTINE CANCER FOLLOW-UP: ICD-10-CM

## 2024-04-04 DIAGNOSIS — Z85.820 PERSONAL HISTORY OF MALIGNANT MELANOMA OF SKIN: Primary | ICD-10-CM

## 2024-04-04 LAB
ALBUMIN SERPL-MCNC: 3.9 G/DL (ref 3.4–5)
ALBUMIN/GLOB SERPL: 1.2 {RATIO} (ref 1–2)
ALP LIVER SERPL-CCNC: 83 U/L
ALT SERPL-CCNC: 31 U/L
ANION GAP SERPL CALC-SCNC: 7 MMOL/L (ref 0–18)
AST SERPL-CCNC: 22 U/L (ref 15–37)
BASOPHILS # BLD AUTO: 0.06 X10(3) UL (ref 0–0.2)
BASOPHILS NFR BLD AUTO: 0.7 %
BILIRUB SERPL-MCNC: 0.3 MG/DL (ref 0.1–2)
BUN BLD-MCNC: 6 MG/DL (ref 9–23)
CALCIUM BLD-MCNC: 9.8 MG/DL (ref 8.5–10.1)
CHLORIDE SERPL-SCNC: 106 MMOL/L (ref 98–112)
CO2 SERPL-SCNC: 26 MMOL/L (ref 21–32)
CREAT BLD-MCNC: 0.76 MG/DL
EGFRCR SERPLBLD CKD-EPI 2021: 82 ML/MIN/1.73M2 (ref 60–?)
EOSINOPHIL # BLD AUTO: 0.14 X10(3) UL (ref 0–0.7)
EOSINOPHIL NFR BLD AUTO: 1.6 %
ERYTHROCYTE [DISTWIDTH] IN BLOOD BY AUTOMATED COUNT: 12.5 %
GLOBULIN PLAS-MCNC: 3.2 G/DL (ref 2.8–4.4)
GLUCOSE BLD-MCNC: 107 MG/DL (ref 70–99)
HCT VFR BLD AUTO: 39.7 %
HGB BLD-MCNC: 14.1 G/DL
IMM GRANULOCYTES # BLD AUTO: 0.04 X10(3) UL (ref 0–1)
IMM GRANULOCYTES NFR BLD: 0.5 %
LYMPHOCYTES # BLD AUTO: 2.73 X10(3) UL (ref 1–4)
LYMPHOCYTES NFR BLD AUTO: 31.6 %
MCH RBC QN AUTO: 33.9 PG (ref 26–34)
MCHC RBC AUTO-ENTMCNC: 35.5 G/DL (ref 31–37)
MCV RBC AUTO: 95.4 FL
MONOCYTES # BLD AUTO: 0.61 X10(3) UL (ref 0.1–1)
MONOCYTES NFR BLD AUTO: 7.1 %
NEUTROPHILS # BLD AUTO: 5.07 X10 (3) UL (ref 1.5–7.7)
NEUTROPHILS # BLD AUTO: 5.07 X10(3) UL (ref 1.5–7.7)
NEUTROPHILS NFR BLD AUTO: 58.5 %
OSMOLALITY SERPL CALC.SUM OF ELEC: 286 MOSM/KG (ref 275–295)
PLATELET # BLD AUTO: 439 10(3)UL (ref 150–450)
POTASSIUM SERPL-SCNC: 3.6 MMOL/L (ref 3.5–5.1)
PROT SERPL-MCNC: 7.1 G/DL (ref 6.4–8.2)
RBC # BLD AUTO: 4.16 X10(6)UL
SODIUM SERPL-SCNC: 139 MMOL/L (ref 136–145)
TSI SER-ACNC: 2.85 MIU/ML (ref 0.36–3.74)
WBC # BLD AUTO: 8.7 X10(3) UL (ref 4–11)

## 2024-04-04 PROCEDURE — 99215 OFFICE O/P EST HI 40 MIN: CPT | Performed by: INTERNAL MEDICINE

## 2024-04-04 RX ORDER — METOPROLOL SUCCINATE 50 MG/1
50 TABLET, EXTENDED RELEASE ORAL DAILY
COMMUNITY

## 2024-04-04 NOTE — PROGRESS NOTES
Patient here for follow-up. States she stopped prednisone one month ago and her rash then became worse. She now has a rash to her face and scalp. Will meet with dermatology in early June. Having more bone pains. Will meet with ortho. Met with rheum. States they told her she does not have lupus. Energy levels are still poor. Having more dyspnea and occasional chest pressure.    "Urobilinogen, Urine 0.2 Negative    Nitrite Negative Negative    Leukocyte Esterase Moderate (A) Negative    Occult Blood Negative Negative    Micro Urine Req Microscopic    URINE MICROSCOPIC (W/UA)   Result Value Ref Range    WBC  (A) /hpf    RBC 5-10 (A) /hpf    Bacteria Moderate (A) None /hpf    Epithelial Cells Moderate (A) /hpf    Ca Oxalate Crystal Moderate /hpf    Hyaline Cast 0-2 /lpf       RADIOLOGY  I have independently interpreted the diagnostic imaging associated with this visit and am waiting the final reading from the radiologist.   My preliminary interpretation is as follows: right sided stone, with hydronephrosis  Radiologist interpretation:   CT-RENAL COLIC EVALUATION(A/P W/O)   Final Result      1.  5 mm right ureteropelvic junction stone with mild hydronephrosis seen above that level. The right kidney is edematous with perinephric fat stranding.      2.  Prior cholecystectomy and hysterectomy.      3.  Fatty liver with again seen focal area of decreased attenuation adjacent to the gallbladder fossa likely representing an area of focal fatty change.      DX-PORTABLE FLUORO > 1 HOUR    (Results Pending)   [id number] VIEW    (Results Pending)       COURSE & MEDICAL DECISION MAKING    ED Observation Status? No; Patient does not meet criteria for ED Observation.     INITIAL ASSESSMENT, COURSE AND PLAN  Care Narrative:     This is a 46-year-old female.  She is well-known to this department, this hospital.  She has been hospitalized 3 times in the last 4 months.  She was most recently discharged on March 5 after a 4-day hospitalization for GI bleeding.  However, hemoglobins remained stable, hematemesis resolved and she did not undergo any interventions or endoscopy.  Patient is adamant that she has not returned to drinking since discharge.  She is having diffuse abdominal pain, particularly, in the right upper quadrant and is reporting blood in her stool, blood in her vomit.  She has a history of  [facility] 

## 2024-04-08 ENCOUNTER — NURSE NAVIGATOR ENCOUNTER (OUTPATIENT)
Dept: HEMATOLOGY/ONCOLOGY | Facility: HOSPITAL | Age: 74
End: 2024-04-08

## 2024-04-09 ENCOUNTER — SOCIAL WORK SERVICES (OUTPATIENT)
Dept: HEMATOLOGY/ONCOLOGY | Facility: HOSPITAL | Age: 74
End: 2024-04-09

## 2024-04-09 NOTE — PROGRESS NOTES
received request to send documents from 24, with mileage; faxed to [facility] F# [phone] as requested.   ( 10/20/1942, [address] [address], Claim # [id number], Policy No. [id number])

## 2024-09-09 ENCOUNTER — TELEPHONE (OUTPATIENT)
Dept: HEMATOLOGY/ONCOLOGY | Facility: HOSPITAL | Age: 74
End: 2024-09-09

## 2024-09-09 NOTE — TELEPHONE ENCOUNTER
Maricel   [phone]   I have questions about a lab I need ordered would like a return call. Thanks Jahaira

## 2024-09-18 NOTE — PROGRESS NOTES
[facility] Note    Diagnosis: Metastatic Melanoma, BRAF WT    Treatment History:   1. Re-excision of subcutaneous metastatic melanoma nodule    2. Keytruda: 10/6/22-4/14/23: stopped early due to rash     Visit Diagnosis:  1. Personal history of malignant melanoma of skin    2. Abnormal findings on diagnostic imaging of other specified body structures        History of Present Illness: 72F with a PMH of GERD and melanoma in situ referred by Dr. Cobb for malignant melanoma.     She has a history of melanoma in situ from 2014 that was excised. She had a subcutaneous nodule in June 2022 that was proven to be metastatic melanoma. This was re-excised on 9/16/22. Her case was discussed at tumor board and consideration of immunotherapy was given. She was Keytruda from 10/2022-04/2023 (10 cycles). She stopped early due to rash.     Hematology/Oncology History:   -10/2014: Melanoma in situ removed from her back by Dr. Hernández. She was undergoing annual skin checks.     -She noted a subcutaneous nodule around June 2022. This was thought to be a melanoma which was resected and noted to be metastatic melanoma. There were positive margins. Of note, this lesion was adjacent to her previous melanoma.     -PET/CT on 8/11/2022: There is no evidence of metabolically active metastatic disease.    -Reexcision of upper back metastatic malignant melanoma on 8/29/2022 with Dr. Cobb. Pathology showed residual metastatic melanoma involving the dermis and subcutis. She discussed pathology with Dr. Cobb and there are plans for re-excision on 9/16/22. Re-excision on 9/16/22 showed a residual foci of metastatic melanoma, tumor < 1 mm from the margin.     -Keytruda: 10/6/22-Current  C1: 10/6/22  C2: 10/27/22  C3: 11/17/22  C4: 12/8/22  PET/CT on 12/15/22 with No evidence of disease   C5: 12/29/22  C6: 1/19/23  C7: 2/9/23  C8: 3/3/23  C9: 3/24/23. Signatera was negative   PET/CT on 3/29/23: No evidence of  disease   C10: 4/14/23  --Treatment stopped early due to rash---    -PET/CT and Signatera on 6/20/23: TRICIA    -Signatera 09/21/2023: negative     -PET/CT on 10/20/23: TRICIA    -Signatera negative 04/2024    -PET/CT 9/19/24: 0.6 x 0.9 cm R cervical LN with an SUV of 2.9 which is non specific but new.     Interval History  -Signatera today  -PET/CT today reviewed   -No recent infections, vaccinations, cough, neck swelling   -energy is good  -following with dermatology    Review of Systems: 12 Point ROS was completed and pertinent positives are in the HPI    Current Outpatient Medications on File Prior to Visit   Medication Sig Dispense Refill    metoprolol succinate ER 50 MG Oral Tablet 24 Hr Take 1 tablet (50 mg total) by mouth daily.      alendronate 70 MG Oral Tab once a week.      Meloxicam 7.5 MG Oral Tab daily.      atorvastatin 40 MG Oral Tab Take 1 tablet (40 mg total) by mouth daily.      pantoprazole 40 MG Oral Tab EC Take 1 tablet (40 mg total) by mouth every morning before breakfast.      Multiple Vitamin (MULTI-VITAMIN DAILY) Oral Tab Take  by mouth.      Cholecalciferol (VITAMIN D) 1000 UNITS Oral Cap Take  by mouth.      cetirizine 10 MG Oral Tab Take 1 tablet (10 mg total) by mouth every 12 (twelve) hours as needed for Allergies. (Patient not taking: Reported on 9/21/2023) 60 tablet 0    fluticasone propionate 50 MCG/ACT Nasal Suspension as needed. (Patient not taking: Reported on 4/14/2023)       No current facility-administered medications on file prior to visit.     Past Medical History:    Cancer (HCC)    melanoma 8 years ago    COVID-19    cold-like symptoms, cough and runny nose    Visual impairment    reading glasses     Past Surgical History:   Procedure Laterality Date    Carpal tunnel release Bilateral 2005    Cataract      Cholecystectomy      Colonoscopy      Other      ruptured cyst on ovary    Other  2014    melanoma removal    Skin surgery  10-2-14    excision/ MMIS/ right upper medial  back/     Upper gi endoscopy,exam       Social History     Socioeconomic History    Marital status:    Tobacco Use    Smoking status: Former    Smokeless tobacco: Never    Tobacco comments:     quit 2012   Vaping Use    Vaping status: Never Used   Substance and Sexual Activity    Alcohol use: Yes     Alcohol/week: 14.0 - 21.0 standard drinks of alcohol     Types: 7 - 14 Glasses of wine, 7 Cans of beer per week    Drug use: Never      Family History   Problem Relation Age of Onset    Cancer Mother         skin       Physical Exam  Height: 157.5 cm (5' 2.01\") (09/19 1314)  Weight: 63.7 kg (140 lb 8 oz) (09/19 1314)  BSA (Calculated - sq m): 1.65 sq meters (09/19 1314)  Pulse: 89 (09/19 1314)  BP: 139/83 (09/19 1314)  Temp: 98.9 °F (37.2 °C) (09/19 1314)  Do Not Use - Resp Rate: --  SpO2: 93 % (09/19 1314)     General: NAD, AOX3  HEENT: clear op, mmm, no jvd, no scleral icterus, small palpable soft tissue mass on neck  CV: RR  Extremities: No edema   Lungs:  no increased work of breathing  Abd: non-distended   Neuro: CN: II-XII grossly intact  Skin: mild maculopapular rash on face, arms    Results:  Lab Results   Component Value Date    WBC 10.7 09/19/2024    HGB 14.3 09/19/2024    HCT 40.2 09/19/2024    MCV 95.5 09/19/2024    .0 09/19/2024     Lab Results   Component Value Date     04/04/2024    K 3.6 04/04/2024    CO2 26.0 04/04/2024     04/04/2024    BUN 6 (L) 04/04/2024    ALB 3.9 04/04/2024       No results found for: \"LDH\"    Radiology:  Reviewed     Pathology:   Final Diagnosis:   Skin, right upper back, excision:  -Residual metastatic melanoma involving the dermis and subcutis.  -See comment.      Electronically signed by Anna Lopez MD on 9/7/2022 at 1348      Final Diagnosis Comment     There are multiple foci of tumor scattered in the dermis and subcutis, measuring up to 4 mm in greatest dimension.  The tumor has a similar morphology to that seen in a recent excision ([facility]-;  7/19/2022), which was reviewed.     The findings are consistent with residual metastatic melanoma.  The tumor involves the 6:00-9:00 peripheral dermal margin and is at less than 1 mm from the deep margin towards the 6:00 tip.     Assessment and Plan:  Metastatic Melanoma, BRAF WT (low burden disease)   -We discussed that this since her lesion appears to be a metastatic deposit, I recommended that she proceed with immunotherapy for 1-2 years pending tolerability. She has developed a progressively worse rash. She was only able to complete 10 cycles of keytruda (last 04/2023). She is now on observation with q3-6 month PET and q3 month signatera, CBC, CMP.    Her PET/CT from today shows a non specific 0.6 x 0.9 cm R Cervical LN with a low SUV of 2.9. Signatera is pending. We will review in melanoma tumor board.     Grade 1-2 Rash: occurred 1 day after Keytruda.   -She stopped zyrtec and prednisone around Aug 1. Only mild improvement with low dose steroids. Of note, her LALY check in June 2023 by alicia PCP was markedly elevated-this may explain her rash and why she reacted so quickly after immunotherapy.     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 DONAL was negative. Unclear if this is playing a role in her compaints.     Reported Mass on Chest wall: no identifiable lesion. Pet negative. Thyroid US unremarkable aside from subcutaneous fat. Can consider CT neck if bothersome    HTN: follow up with PCP    RTC in 3 months     [facility]

## 2024-09-19 ENCOUNTER — HOSPITAL ENCOUNTER (OUTPATIENT)
Dept: NUCLEAR MEDICINE | Facility: HOSPITAL | Age: 74
Discharge: HOME | End: 2024-09-19
Attending: INTERNAL MEDICINE
Payer: MEDICARE

## 2024-09-19 ENCOUNTER — OFFICE VISIT (OUTPATIENT)
Dept: HEMATOLOGY/ONCOLOGY | Facility: HOSPITAL | Age: 74
End: 2024-09-19
Attending: INTERNAL MEDICINE
Payer: MEDICARE

## 2024-09-19 ENCOUNTER — NURSE NAVIGATOR ENCOUNTER (OUTPATIENT)
Dept: HEMATOLOGY/ONCOLOGY | Facility: HOSPITAL | Age: 74
End: 2024-09-19

## 2024-09-19 VITALS
TEMPERATURE: 99 F | SYSTOLIC BLOOD PRESSURE: 139 MMHG | OXYGEN SATURATION: 93 % | BODY MASS INDEX: 25.53 KG/M2 | WEIGHT: 140.5 LBS | HEART RATE: 89 BPM | DIASTOLIC BLOOD PRESSURE: 83 MMHG | HEIGHT: 62.01 IN | RESPIRATION RATE: 16 BRPM

## 2024-09-19 DIAGNOSIS — R93.89 ABNORMAL FINDINGS ON DIAGNOSTIC IMAGING OF OTHER SPECIFIED BODY STRUCTURES: ICD-10-CM

## 2024-09-19 DIAGNOSIS — Z85.820 PERSONAL HISTORY OF MALIGNANT MELANOMA OF SKIN: Primary | ICD-10-CM

## 2024-09-19 DIAGNOSIS — Z85.820 PERSONAL HISTORY OF MALIGNANT MELANOMA OF SKIN: ICD-10-CM

## 2024-09-19 DIAGNOSIS — Z08 ENCOUNTER FOR ROUTINE CANCER FOLLOW-UP: ICD-10-CM

## 2024-09-19 LAB
ALBUMIN SERPL-MCNC: 4.6 G/DL (ref 3.2–4.8)
ALBUMIN/GLOB SERPL: 1.8 {RATIO} (ref 1–2)
ALP LIVER SERPL-CCNC: 91 U/L
ALT SERPL-CCNC: 25 U/L
ANION GAP SERPL CALC-SCNC: 8 MMOL/L (ref 0–18)
AST SERPL-CCNC: 28 U/L (ref ?–34)
BASOPHILS # BLD AUTO: 0.06 X10(3) UL (ref 0–0.2)
BASOPHILS NFR BLD AUTO: 0.6 %
BILIRUB SERPL-MCNC: 0.6 MG/DL (ref 0.2–1.1)
BUN BLD-MCNC: 8 MG/DL (ref 9–23)
CALCIUM BLD-MCNC: 9.9 MG/DL (ref 8.7–10.4)
CHLORIDE SERPL-SCNC: 106 MMOL/L (ref 98–112)
CO2 SERPL-SCNC: 24 MMOL/L (ref 21–32)
CREAT BLD-MCNC: 0.96 MG/DL
EGFRCR SERPLBLD CKD-EPI 2021: 62 ML/MIN/1.73M2 (ref 60–?)
EOSINOPHIL # BLD AUTO: 0.15 X10(3) UL (ref 0–0.7)
EOSINOPHIL NFR BLD AUTO: 1.4 %
ERYTHROCYTE [DISTWIDTH] IN BLOOD BY AUTOMATED COUNT: 12.5 %
FASTING STATUS PATIENT QL REPORTED: NO
GLOBULIN PLAS-MCNC: 2.5 G/DL (ref 2–3.5)
GLUCOSE BLD-MCNC: 110 MG/DL (ref 70–99)
GLUCOSE BLD-MCNC: 119 MG/DL (ref 70–99)
HCT VFR BLD AUTO: 40.2 %
HGB BLD-MCNC: 14.3 G/DL
IMM GRANULOCYTES # BLD AUTO: 0.03 X10(3) UL (ref 0–1)
IMM GRANULOCYTES NFR BLD: 0.3 %
LYMPHOCYTES # BLD AUTO: 3.42 X10(3) UL (ref 1–4)
LYMPHOCYTES NFR BLD AUTO: 32 %
MCH RBC QN AUTO: 34 PG (ref 26–34)
MCHC RBC AUTO-ENTMCNC: 35.6 G/DL (ref 31–37)
MCV RBC AUTO: 95.5 FL
MONOCYTES # BLD AUTO: 0.69 X10(3) UL (ref 0.1–1)
MONOCYTES NFR BLD AUTO: 6.5 %
NEUTROPHILS # BLD AUTO: 6.34 X10 (3) UL (ref 1.5–7.7)
NEUTROPHILS # BLD AUTO: 6.34 X10(3) UL (ref 1.5–7.7)
NEUTROPHILS NFR BLD AUTO: 59.2 %
OSMOLALITY SERPL CALC.SUM OF ELEC: 285 MOSM/KG (ref 275–295)
PLATELET # BLD AUTO: 408 10(3)UL (ref 150–450)
POTASSIUM SERPL-SCNC: 4.1 MMOL/L (ref 3.5–5.1)
PROT SERPL-MCNC: 7.1 G/DL (ref 5.7–8.2)
RBC # BLD AUTO: 4.21 X10(6)UL
SODIUM SERPL-SCNC: 138 MMOL/L (ref 136–145)
TSI SER-ACNC: 2.92 MIU/ML (ref 0.55–4.78)
WBC # BLD AUTO: 10.7 X10(3) UL (ref 4–11)

## 2024-09-19 PROCEDURE — 99215 OFFICE O/P EST HI 40 MIN: CPT | Performed by: INTERNAL MEDICINE

## 2024-09-19 PROCEDURE — G2211 COMPLEX E/M VISIT ADD ON: HCPCS | Performed by: INTERNAL MEDICINE

## 2024-09-19 PROCEDURE — 82962 GLUCOSE BLOOD TEST: CPT

## 2024-09-19 PROCEDURE — 78816 PET IMAGE W/CT FULL BODY: CPT | Performed by: INTERNAL MEDICINE

## 2024-09-19 NOTE — PROGRESS NOTES
Patient here for follow-up. Energy levels are low. Still has some hip pain. Had her PET earlier today.

## 2024-09-25 ENCOUNTER — SOCIAL WORK SERVICES (OUTPATIENT)
Dept: HEMATOLOGY/ONCOLOGY | Facility: HOSPITAL | Age: 74
End: 2024-09-25

## 2024-09-25 NOTE — PROGRESS NOTES
received request to send documents from 24, with mileage; faxed to [facility] F# [phone] as requested.   ( 10/20/1942, [address] [facility], [facility] [address], Claim # [id number], Policy No. [id number])    Letter:     Maricel Carrillo ( 10/20/1942) is a patient under my care at [facility].      She completed lab work, an MD visit, and a PET scan on 9/15/24 in [place], [place]. Please find her attached mileage from [address] [place] [address] to [facility] in [place], [place].        Confirmation sent to pt via Webt[employer] message.    Fernanda Tee LCSW   at [address], [address], [place] [address]  [address] [place] [address]  Ph: [phone] [email or web address]  Fax: [phone]

## 2025-04-15 ENCOUNTER — TELEPHONE (OUTPATIENT)
Age: 75
End: 2025-04-15

## 2025-04-15 NOTE — TELEPHONE ENCOUNTER
Patient called to schedule follow up appointment and lab appointment with Dr. Day on 5/7/25. No orders for labs are currently in system, so sending note for awareness. Thank you.

## 2025-05-06 NOTE — PROGRESS NOTES
[facility] Note    Diagnosis: Metastatic Melanoma, BRAF WT    Treatment History:   1. Re-excision of subcutaneous metastatic melanoma nodule    2. Keytruda: 10/6/22-4/14/23: stopped early due to rash     Visit Diagnosis:  No diagnosis found.      History of Present Illness: 72F with a PMH of GERD and melanoma in situ referred by Dr. Cobb for malignant melanoma.     She has a history of melanoma in situ from 2014 that was excised. She had a subcutaneous nodule in June 2022 that was proven to be metastatic melanoma. This was re-excised on 9/16/22. Her case was discussed at tumor board and consideration of immunotherapy was given. She was Keytruda from 10/2022-04/2023 (10 cycles). She stopped early due to rash.     Hematology/Oncology History:   -10/2014: Melanoma in situ removed from her back by Dr. Hernández. She was undergoing annual skin checks.     -She noted a subcutaneous nodule around June 2022. This was thought to be a melanoma which was resected and noted to be metastatic melanoma. There were positive margins. Of note, this lesion was adjacent to her previous melanoma.     -PET/CT on 8/11/2022: There is no evidence of metabolically active metastatic disease.    -Reexcision of upper back metastatic malignant melanoma on 8/29/2022 with Dr. Cobb. Pathology showed residual metastatic melanoma involving the dermis and subcutis. She discussed pathology with Dr. Cobb and there are plans for re-excision on 9/16/22. Re-excision on 9/16/22 showed a residual foci of metastatic melanoma, tumor < 1 mm from the margin.     -Keytruda: 10/6/22-Current  C1: 10/6/22  C2: 10/27/22  C3: 11/17/22  C4: 12/8/22  PET/CT on 12/15/22 with No evidence of disease   C5: 12/29/22  C6: 1/19/23  C7: 2/9/23  C8: 3/3/23  C9: 3/24/23. Signatera was negative   PET/CT on 3/29/23: No evidence of disease   C10: 4/14/23  --Treatment stopped early due to rash---    -PET/CT and Signatera on 6/20/23:  TRICIA    -Signatera 09/21/2023: negative     -PET/CT on 10/20/23: TRICIA    -Signatera negative 04/2024    -PET/CT 9/19/24: 0.6 x 0.9 cm R cervical LN with an SUV of 2.9 which is non specific but new. Tumor board review: PET/CT reviewed-LN appears reactive. US will not help in defining this LN. Repeat PET/CT in 3 months recommended.     Interval History  -PET/CT  -Signatera    Review of Systems: 12 Point ROS was completed and pertinent positives are in the HPI    Current Outpatient Medications on File Prior to Visit   Medication Sig Dispense Refill    metoprolol succinate ER 50 MG Oral Tablet 24 Hr Take 1 tablet (50 mg total) by mouth daily.      cetirizine 10 MG Oral Tab Take 1 tablet (10 mg total) by mouth every 12 (twelve) hours as needed for Allergies. (Patient not taking: Reported on 9/21/2023) 60 tablet 0    alendronate 70 MG Oral Tab once a week.      fluticasone propionate 50 MCG/ACT Nasal Suspension as needed. (Patient not taking: Reported on 4/14/2023)      Meloxicam 7.5 MG Oral Tab daily.      atorvastatin 40 MG Oral Tab Take 1 tablet (40 mg total) by mouth daily.      pantoprazole 40 MG Oral Tab EC Take 1 tablet (40 mg total) by mouth every morning before breakfast.      Multiple Vitamin (MULTI-VITAMIN DAILY) Oral Tab Take  by mouth.      Cholecalciferol (VITAMIN D) 1000 UNITS Oral Cap Take  by mouth.       No current facility-administered medications on file prior to visit.     Past Medical History:    Cancer (HCC)    melanoma 8 years ago    COVID-19    cold-like symptoms, cough and runny nose    Visual impairment    reading glasses     Past Surgical History:   Procedure Laterality Date    Carpal tunnel release Bilateral 2005    Cataract      Cholecystectomy      Colonoscopy      Other      ruptured cyst on ovary    Other  2014    melanoma removal    Skin surgery  10-2-14    excision/ MMIS/ right upper medial back/     Upper gi endoscopy,exam       Social History     Socioeconomic History    Marital status:     Tobacco Use    Smoking status: Former    Smokeless tobacco: Never    Tobacco comments:     quit 2012   Vaping Use    Vaping status: Never Used   Substance and Sexual Activity    Alcohol use: Yes     Alcohol/week: 14.0 - 21.0 standard drinks of alcohol     Types: 7 - 14 Glasses of wine, 7 Cans of beer per week    Drug use: Never      Family History   Problem Relation Age of Onset    Cancer Mother         skin       Physical Exam  Height: --  Weight: --  BSA (Calculated - sq m): --  Pulse: --  BP: --  Temp: --  Do Not Use - Resp Rate: --  SpO2: --     General: NAD, AOX3  HEENT: clear op, mmm, no jvd, no scleral icterus, small palpable soft tissue mass on neck  CV: RR  Extremities: No edema   Lungs:  no increased work of breathing  Abd: non-distended   Neuro: CN: II-XII grossly intact  Skin: mild maculopapular rash on face, arms    Results:  Lab Results   Component Value Date    WBC 10.7 09/19/2024    HGB 14.3 09/19/2024    HCT 40.2 09/19/2024    MCV 95.5 09/19/2024    .0 09/19/2024     Lab Results   Component Value Date     09/19/2024    K 4.1 09/19/2024    CO2 24.0 09/19/2024     09/19/2024    BUN 8 (L) 09/19/2024    ALB 4.6 09/19/2024       No results found for: \"LDH\"    Radiology:  Reviewed     Pathology:   Final Diagnosis:   Skin, right upper back, excision:  -Residual metastatic melanoma involving the dermis and subcutis.  -See comment.      Electronically signed by Anna Lopez MD on 9/7/2022 at 1348      Final Diagnosis Comment     There are multiple foci of tumor scattered in the dermis and subcutis, measuring up to 4 mm in greatest dimension.  The tumor has a similar morphology to that seen in a recent excision ([facility]-; 7/19/2022), which was reviewed.     The findings are consistent with residual metastatic melanoma.  The tumor involves the 6:00-9:00 peripheral dermal margin and is at less than 1 mm from the deep margin towards the 6:00 tip.     Assessment and  Plan:  Metastatic Melanoma, BRAF WT (low burden disease)   -We discussed that this since her lesion appears to be a metastatic deposit, I recommended that she proceed with immunotherapy for 1-2 years pending tolerability. She has developed a progressively worse rash. She was only able to complete 10 cycles of keytruda (last 04/2023). She is now on observation with q3-6 month PET and q3 month signatera, CBC, CMP and TSH    Grade 1-2 Rash: occurred 1 day after Keytruda.   -She stopped zyrtec and prednisone around Aug 1. Only mild improvement with low dose steroids. Of note, her LALY check in June 2023 by alicia PCP was markedly elevated-this may explain her rash and why she reacted so quickly after immunotherapy.     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 DONAL was negative. Unclear if this is playing a role in her compaints.     Reported Mass on Chest wall: no identifiable lesion. Pet negative. Thyroid US unremarkable aside from subcutaneous fat. Can consider CT neck if bothersome    HTN: follow up with PCP    RTC in 3 months     [facility]

## 2025-05-07 ENCOUNTER — OFFICE VISIT (OUTPATIENT)
Age: 75
End: 2025-05-07
Attending: INTERNAL MEDICINE
Payer: MEDICARE

## 2025-05-07 ENCOUNTER — APPOINTMENT (OUTPATIENT)
Age: 75
End: 2025-05-07
Attending: INTERNAL MEDICINE
Payer: MEDICARE

## 2025-05-07 DIAGNOSIS — Z85.820 PERSONAL HISTORY OF MALIGNANT MELANOMA OF SKIN: ICD-10-CM

## 2025-05-07 DIAGNOSIS — R93.89 ABNORMAL FINDINGS ON DIAGNOSTIC IMAGING OF OTHER SPECIFIED BODY STRUCTURES: ICD-10-CM

## 2025-05-14 NOTE — PROGRESS NOTES
[facility] Note    Diagnosis: Metastatic Melanoma, BRAF WT    Treatment History:   1. Re-excision of subcutaneous metastatic melanoma nodule    2. Keytruda: 10/6/22-4/14/23: stopped early due to rash     Visit Diagnosis:  1. Personal history of malignant melanoma of skin    2. Malignant melanoma, unspecified site (HCC)    3. Melanoma of overlapping sites (HCC)          History of Present Illness: 74F with a PMH of GERD and melanoma in situ referred by Dr. Cobb for malignant melanoma.     She has a history of melanoma in situ from 2014 that was excised. She had a subcutaneous nodule in June 2022 that was proven to be metastatic melanoma. This was re-excised on 9/16/22. Her case was discussed at tumor board and consideration of immunotherapy was given. She was Keytruda from 10/2022-04/2023 (10 cycles). She stopped early due to rash.     Hematology/Oncology History:   -10/2014: Melanoma in situ removed from her back by Dr. Hernández. She was undergoing annual skin checks.     -She noted a subcutaneous nodule around June 2022. This was thought to be a melanoma which was resected and noted to be metastatic melanoma. There were positive margins. Of note, this lesion was adjacent to her previous melanoma.     -PET/CT on 8/11/2022: There is no evidence of metabolically active metastatic disease.    -Reexcision of upper back metastatic malignant melanoma on 8/29/2022 with Dr. Cobb. Pathology showed residual metastatic melanoma involving the dermis and subcutis. She discussed pathology with Dr. Cobb and there are plans for re-excision on 9/16/22. Re-excision on 9/16/22 showed a residual foci of metastatic melanoma, tumor < 1 mm from the margin.     -Keytruda: 10/6/22-Current  C1: 10/6/22  C2: 10/27/22  C3: 11/17/22  C4: 12/8/22  PET/CT on 12/15/22 with No evidence of disease   C5: 12/29/22  C6: 1/19/23  C7: 2/9/23  C8: 3/3/23  C9: 3/24/23. Signatera was negative   PET/CT on 3/29/23: No  evidence of disease   C10: 4/14/23  --Treatment stopped early due to rash---    -PET/CT and Signatera on 6/20/23: TRICIA    -Signatera 09/21/2023: negative     -PET/CT on 10/20/23: TRICIA    -Signatera negative 04/2024    -PET/CT 9/19/24: 0.6 x 0.9 cm R cervical LN with an SUV of 2.9 which is non specific but new. Tumor board review: PET/CT reviewed-LN appears reactive. US will not help in defining this LN. Repeat PET/CT in 3 months recommended.     -PET/CT 05/15/25: R submandibular LN 8 x 7 mm (previous 10 x 7 mm). SUV 2.5.     Interval History  -PET/CT reviewed   -doing well. Energy is good. No new skin lesions  -Skin check with dermatology negative     Review of Systems: 12 Point ROS was completed and pertinent positives are in the HPI    Current Outpatient Medications on File Prior to Visit   Medication Sig Dispense Refill    triamterene-hydroCHLOROthiazide 37.5-25 MG Oral Cap Take 1 capsule by mouth daily.      losartan 25 MG Oral Tab Take 1 tablet (25 mg total) by mouth daily.      metoprolol succinate ER 50 MG Oral Tablet 24 Hr Take 1 tablet (50 mg total) by mouth in the morning. Taking 25 mg.      cetirizine 10 MG Oral Tab Take 1 tablet (10 mg total) by mouth every 12 (twelve) hours as needed for Allergies. 60 tablet 0    alendronate 70 MG Oral Tab once a week.      fluticasone propionate 50 MCG/ACT Nasal Suspension as needed.      Meloxicam 7.5 MG Oral Tab in the morning.      atorvastatin 40 MG Oral Tab Take 1 tablet (40 mg total) by mouth in the morning.      pantoprazole 40 MG Oral Tab EC Take 1 tablet (40 mg total) by mouth every morning before breakfast.      Cholecalciferol (VITAMIN D) 1000 UNITS Oral Cap Take by mouth.      Multiple Vitamin (MULTI-VITAMIN DAILY) Oral Tab Take  by mouth. (Patient not taking: Reported on 5/15/2025)       No current facility-administered medications on file prior to visit.     Past Medical History:    Cancer (HCC)    melanoma 8 years ago    COVID-19    cold-like symptoms,  cough and runny nose    Visual impairment    reading glasses     Past Surgical History:   Procedure Laterality Date    Carpal tunnel release Bilateral 2005    Cataract      Cholecystectomy      Colonoscopy      Other      ruptured cyst on ovary    Other  2014    melanoma removal    Skin surgery  10-2-14    excision/ MMIS/ right upper medial back/     Upper gi endoscopy,exam       Social History     Socioeconomic History    Marital status:    Tobacco Use    Smoking status: Former    Smokeless tobacco: Never    Tobacco comments:     quit 2012   Vaping Use    Vaping status: Never Used   Substance and Sexual Activity    Alcohol use: Yes     Alcohol/week: 14.0 - 21.0 standard drinks of alcohol     Types: 7 - 14 Glasses of wine, 7 Cans of beer per week    Drug use: Never      Family History   Problem Relation Age of Onset    Cancer Mother         skin       Physical Exam  Height: 157.5 cm (5' 2.01\") (05/15 1137)  Weight: 63.7 kg (140 lb 6.4 oz) (05/15 1137)  BSA (Calculated - sq m): 1.64 sq meters (05/15 1137)  Pulse: 97 (05/15 1137)  BP: 122/75 (05/15 1137)  Temp: 97.5 °F (36.4 °C) (05/15 1137)  Do Not Use - Resp Rate: --  SpO2: 95 % (05/15 1137)     General: NAD, AOX3  HEENT: clear op, mmm, no jvd, no scleral icterus, small palpable soft tissue mass on neck  CV: RR  Extremities: No edema   Lungs:  no increased work of breathing  Abd: non-distended   Neuro: CN: II-XII grossly intact  Skin: mild maculopapular rash on face, arms    Results:  Lab Results   Component Value Date    WBC 8.2 05/15/2025    HGB 12.4 05/15/2025    HCT 34.9 (L) 05/15/2025    MCV 93.8 05/15/2025    .0 05/15/2025     Lab Results   Component Value Date     09/19/2024    K 4.1 09/19/2024    CO2 24.0 09/19/2024     09/19/2024    BUN 8 (L) 09/19/2024    ALB 4.6 09/19/2024       No results found for: \"LDH\"    Radiology:  Reviewed     Pathology:   Final Diagnosis:   Skin, right upper back, excision:  -Residual metastatic melanoma  involving the dermis and subcutis.  -See comment.      Electronically signed by Anna Lopez MD on 9/7/2022 at 1348      Final Diagnosis Comment     There are multiple foci of tumor scattered in the dermis and subcutis, measuring up to 4 mm in greatest dimension.  The tumor has a similar morphology to that seen in a recent excision ([facility]-; 7/19/2022), which was reviewed.     The findings are consistent with residual metastatic melanoma.  The tumor involves the 6:00-9:00 peripheral dermal margin and is at less than 1 mm from the deep margin towards the 6:00 tip.     Assessment and Plan:  Metastatic Melanoma, BRAF WT (low burden disease)   -We discussed that this since her lesion appears to be a metastatic deposit, I recommended that she proceed with immunotherapy for 1-2 years pending tolerability. She developed a progressively worse rash. She was only able to complete 10 cycles of keytruda (last 04/2023). She is now on observation with q6 month PET and Signatera     Most recent PET/CT shows a stable and smaller R submandibular LN which appears reactive. WE will repeat a PET/CT in 6 months.     Grade 1-2 Rash: occurred 1 day after Keytruda.   -She stopped zyrtec and prednisone around Aug 1. Only mild improvement with low dose steroids. Of note, her LALY check in June 2023 by alicia PCP was markedly elevated-this may explain her rash and why she reacted so quickly after immunotherapy.     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 DONAL was negative. Unclear if this is playing a role in her compaints.     Reported Mass on Chest wall: no identifiable lesion. Pet negative. Thyroid US unremarkable aside from subcutaneous fat. Can consider CT neck if bothersome    HTN: follow up with PCP    RTC in 6 months. Follow up with dermatology    [facility]

## 2025-05-15 ENCOUNTER — NURSE ONLY (OUTPATIENT)
Age: 75
End: 2025-05-15
Attending: INTERNAL MEDICINE
Payer: MEDICARE

## 2025-05-15 ENCOUNTER — HOSPITAL ENCOUNTER (OUTPATIENT)
Dept: NUCLEAR MEDICINE | Facility: HOSPITAL | Age: 75
Discharge: HOME | End: 2025-05-15
Attending: INTERNAL MEDICINE
Payer: MEDICARE

## 2025-05-15 ENCOUNTER — NURSE NAVIGATOR ENCOUNTER (OUTPATIENT)
Age: 75
End: 2025-05-15

## 2025-05-15 ENCOUNTER — OFFICE VISIT (OUTPATIENT)
Age: 75
End: 2025-05-15
Attending: INTERNAL MEDICINE
Payer: MEDICARE

## 2025-05-15 VITALS
WEIGHT: 140.38 LBS | BODY MASS INDEX: 25.51 KG/M2 | HEIGHT: 62.01 IN | HEART RATE: 97 BPM | RESPIRATION RATE: 16 BRPM | DIASTOLIC BLOOD PRESSURE: 75 MMHG | OXYGEN SATURATION: 95 % | TEMPERATURE: 98 F | SYSTOLIC BLOOD PRESSURE: 122 MMHG

## 2025-05-15 DIAGNOSIS — Z85.820 PERSONAL HISTORY OF MALIGNANT MELANOMA OF SKIN: Primary | ICD-10-CM

## 2025-05-15 DIAGNOSIS — Z85.820 PERSONAL HISTORY OF MALIGNANT MELANOMA OF SKIN: ICD-10-CM

## 2025-05-15 DIAGNOSIS — C43.8: ICD-10-CM

## 2025-05-15 DIAGNOSIS — C43.9 MALIGNANT MELANOMA, UNSPECIFIED SITE (HCC): ICD-10-CM

## 2025-05-15 DIAGNOSIS — R93.89 ABNORMAL FINDINGS ON DIAGNOSTIC IMAGING OF OTHER SPECIFIED BODY STRUCTURES: ICD-10-CM

## 2025-05-15 LAB
ALBUMIN SERPL-MCNC: 4.9 G/DL (ref 3.2–4.8)
ALBUMIN/GLOB SERPL: 1.9 {RATIO} (ref 1–2)
ALP LIVER SERPL-CCNC: 78 U/L (ref 55–142)
ALT SERPL-CCNC: 19 U/L (ref 10–49)
ANION GAP SERPL CALC-SCNC: 7 MMOL/L (ref 0–18)
AST SERPL-CCNC: 25 U/L (ref ?–34)
BASOPHILS # BLD AUTO: 0.04 X10(3) UL (ref 0–0.2)
BASOPHILS NFR BLD AUTO: 0.5 %
BILIRUB SERPL-MCNC: 0.6 MG/DL (ref 0.2–1.1)
BUN BLD-MCNC: 18 MG/DL (ref 9–23)
CALCIUM BLD-MCNC: 10.2 MG/DL (ref 8.7–10.6)
CHLORIDE SERPL-SCNC: 100 MMOL/L (ref 98–112)
CO2 SERPL-SCNC: 26 MMOL/L (ref 21–32)
CREAT BLD-MCNC: 1.53 MG/DL (ref 0.55–1.02)
EGFRCR SERPLBLD CKD-EPI 2021: 35 ML/MIN/1.73M2 (ref 60–?)
EOSINOPHIL # BLD AUTO: 0.13 X10(3) UL (ref 0–0.7)
EOSINOPHIL NFR BLD AUTO: 1.6 %
ERYTHROCYTE [DISTWIDTH] IN BLOOD BY AUTOMATED COUNT: 13.1 %
GLOBULIN PLAS-MCNC: 2.6 G/DL (ref 2–3.5)
GLUCOSE BLD-MCNC: 117 MG/DL (ref 70–99)
GLUCOSE BLD-MCNC: 131 MG/DL (ref 70–99)
HCT VFR BLD AUTO: 34.9 % (ref 35–48)
HGB BLD-MCNC: 12.4 G/DL (ref 12–16)
IMM GRANULOCYTES # BLD AUTO: 0.02 X10(3) UL (ref 0–1)
IMM GRANULOCYTES NFR BLD: 0.2 %
LYMPHOCYTES # BLD AUTO: 2.36 X10(3) UL (ref 1–4)
LYMPHOCYTES NFR BLD AUTO: 28.8 %
MCH RBC QN AUTO: 33.3 PG (ref 26–34)
MCHC RBC AUTO-ENTMCNC: 35.5 G/DL (ref 31–37)
MCV RBC AUTO: 93.8 FL (ref 80–100)
MONOCYTES # BLD AUTO: 0.35 X10(3) UL (ref 0.1–1)
MONOCYTES NFR BLD AUTO: 4.3 %
NEUTROPHILS # BLD AUTO: 5.29 X10 (3) UL (ref 1.5–7.7)
NEUTROPHILS # BLD AUTO: 5.29 X10(3) UL (ref 1.5–7.7)
NEUTROPHILS NFR BLD AUTO: 64.6 %
OSMOLALITY SERPL CALC.SUM OF ELEC: 280 MOSM/KG (ref 275–295)
PLATELET # BLD AUTO: 391 10(3)UL (ref 150–450)
POTASSIUM SERPL-SCNC: 4 MMOL/L (ref 3.5–5.1)
PROT SERPL-MCNC: 7.5 G/DL (ref 5.7–8.2)
RBC # BLD AUTO: 3.72 X10(6)UL (ref 3.8–5.3)
SODIUM SERPL-SCNC: 133 MMOL/L (ref 136–145)
TSI SER-ACNC: 3.38 UIU/ML (ref 0.55–4.78)
WBC # BLD AUTO: 8.2 X10(3) UL (ref 4–11)

## 2025-05-15 PROCEDURE — 82962 GLUCOSE BLOOD TEST: CPT

## 2025-05-15 PROCEDURE — 78816 PET IMAGE W/CT FULL BODY: CPT | Performed by: INTERNAL MEDICINE

## 2025-05-15 RX ORDER — TRIAMTERENE AND HYDROCHLOROTHIAZIDE 37.5; 25 MG/1; MG/1
1 CAPSULE ORAL DAILY
COMMUNITY
Start: 2025-02-18

## 2025-05-15 RX ORDER — LOSARTAN POTASSIUM 25 MG/1
25 TABLET ORAL DAILY
COMMUNITY
Start: 2025-04-25

## 2025-05-15 NOTE — PROGRESS NOTES
Patient here for follow-up. Had her PET scan earlier today. Would like to discuss results in depth. She is working with cardiology for dyspnea. Will have an echo next week.

## 2025-05-27 ENCOUNTER — SOCIAL WORK SERVICES (OUTPATIENT)
Age: 75
End: 2025-05-27

## (undated) DEVICE — NON-ADHERENT PAD PREPACK: Brand: TELFA

## (undated) DEVICE — SUT SILK 2-0 SH K833H

## (undated) DEVICE — SUT VICRYL 3-0 SH J416H

## (undated) DEVICE — BLADE 24 SS SRG STRL

## (undated) DEVICE — ELECTRODE ESURG 2.75IN EZ CLN

## (undated) DEVICE — HEAD AND NECK CDS-LF: Brand: MEDLINE INDUSTRIES, INC.

## (undated) DEVICE — SHEET,DRAPE,40X58,STERILE: Brand: MEDLINE

## (undated) DEVICE — GOWN,SIRUS,FABRIC-REINFORCED,LARGE: Brand: MEDLINE

## (undated) DEVICE — SLEEVE KENDALL SCD EXPRESS MED

## (undated) DEVICE — MEGADYNE E-Z CLEAN BLADE 2.75"

## (undated) DEVICE — SUT ETHIBOND 2-0 SH X833H

## (undated) DEVICE — #15 STERILE STAINLESS BLADE: Brand: STERILE STAINLESS BLADES

## (undated) DEVICE — SUT PROLENE 2-0 SH 8833H

## (undated) DEVICE — MEDI-VAC SUCTION HANDLE REGULAR CAPACITY: Brand: CARDINAL HEALTH

## (undated) DEVICE — STERILE POLYISOPRENE POWDER-FREE SURGICAL GLOVES: Brand: PROTEXIS

## (undated) DEVICE — UNDYED BRAIDED (POLYGLACTIN 910), SYNTHETIC ABSORBABLE SUTURE: Brand: COATED VICRYL

## (undated) NOTE — Clinical Note
Can we add to melanoma tumor board for follow up. New mildly FDG avid R cervical LN. Its not palpable.  Thanks